# Patient Record
Sex: FEMALE | NOT HISPANIC OR LATINO | Employment: STUDENT | URBAN - METROPOLITAN AREA
[De-identification: names, ages, dates, MRNs, and addresses within clinical notes are randomized per-mention and may not be internally consistent; named-entity substitution may affect disease eponyms.]

---

## 2023-04-22 PROBLEM — E55.9 VITAMIN D DEFICIENCY: Status: ACTIVE | Noted: 2023-04-22

## 2023-04-22 PROBLEM — N92.0 MENORRHAGIA: Status: ACTIVE | Noted: 2023-04-22

## 2023-04-22 PROBLEM — L73.2 HIDRADENITIS SUPPURATIVA: Status: ACTIVE | Noted: 2023-04-22

## 2023-04-22 PROBLEM — F41.8 DEPRESSION WITH ANXIETY: Status: ACTIVE | Noted: 2023-04-22

## 2023-04-22 PROBLEM — N92.6 IRREGULAR MENSES: Status: ACTIVE | Noted: 2023-04-22

## 2023-04-22 PROBLEM — N62 BREAST HYPERTROPHY: Status: ACTIVE | Noted: 2023-04-22

## 2023-04-22 PROBLEM — E66.01 MORBID OBESITY (MULTI): Status: ACTIVE | Noted: 2023-04-22

## 2023-04-22 PROBLEM — L83 ACANTHOSIS NIGRICANS: Status: ACTIVE | Noted: 2023-04-22

## 2023-04-22 PROBLEM — H90.3 BILATERAL SENSORINEURAL HEARING LOSS: Status: ACTIVE | Noted: 2023-04-22

## 2023-04-22 PROBLEM — M54.2 CERVICALGIA: Status: ACTIVE | Noted: 2023-04-22

## 2023-04-22 PROBLEM — F90.9 ATTENTION-DEFICIT/HYPERACTIVITY DISORDER: Status: ACTIVE | Noted: 2023-04-22

## 2023-04-22 PROBLEM — C69.20: Status: ACTIVE | Noted: 2023-04-22

## 2023-04-22 PROBLEM — L83: Status: ACTIVE | Noted: 2023-04-22

## 2023-04-22 PROBLEM — G44.89 CHRONIC MIXED HEADACHE SYNDROME: Status: ACTIVE | Noted: 2023-04-22

## 2023-04-22 PROBLEM — H93.293 AUDITORY DISCRIMINATION IMPAIRMENT, BILATERAL: Status: ACTIVE | Noted: 2023-04-22

## 2023-04-22 PROBLEM — Z15.09: Status: ACTIVE | Noted: 2023-04-22

## 2023-04-22 PROBLEM — Z90.01 H/O ENUCLEATION OF LEFT EYEBALL: Status: ACTIVE | Noted: 2023-04-22

## 2023-04-22 PROBLEM — G47.00 INSOMNIA: Status: ACTIVE | Noted: 2023-04-22

## 2023-04-22 PROBLEM — L30.4 INTERTRIGO: Status: ACTIVE | Noted: 2023-04-22

## 2023-04-22 PROBLEM — F50.9 EATING DISORDER: Status: ACTIVE | Noted: 2023-04-22

## 2023-04-22 PROBLEM — C69.90: Status: ACTIVE | Noted: 2023-04-22

## 2023-04-22 RX ORDER — ARIPIPRAZOLE 5 MG/1
5 TABLET ORAL
COMMUNITY
Start: 2023-04-13

## 2023-04-22 RX ORDER — VENLAFAXINE HYDROCHLORIDE 75 MG/1
75 CAPSULE, EXTENDED RELEASE ORAL DAILY
COMMUNITY
Start: 2022-04-04

## 2023-04-22 RX ORDER — HYDROXYZINE PAMOATE 50 MG/1
50 CAPSULE ORAL NIGHTLY PRN
COMMUNITY
Start: 2022-04-04

## 2023-04-22 RX ORDER — ERGOCALCIFEROL 1.25 MG/1
1.25 CAPSULE ORAL
COMMUNITY

## 2023-04-22 RX ORDER — ROSUVASTATIN CALCIUM 5 MG/1
5 TABLET, COATED ORAL DAILY
COMMUNITY

## 2023-04-24 ENCOUNTER — OFFICE VISIT (OUTPATIENT)
Dept: PRIMARY CARE | Facility: CLINIC | Age: 23
End: 2023-04-24
Payer: COMMERCIAL

## 2023-04-24 VITALS
OXYGEN SATURATION: 97 % | WEIGHT: 293 LBS | BODY MASS INDEX: 48.82 KG/M2 | SYSTOLIC BLOOD PRESSURE: 120 MMHG | HEART RATE: 89 BPM | HEIGHT: 65 IN | DIASTOLIC BLOOD PRESSURE: 80 MMHG

## 2023-04-24 DIAGNOSIS — M79.671 CHRONIC FOOT PAIN, RIGHT: Primary | ICD-10-CM

## 2023-04-24 DIAGNOSIS — G89.29 CHRONIC FOOT PAIN, RIGHT: Primary | ICD-10-CM

## 2023-04-24 PROCEDURE — 99214 OFFICE O/P EST MOD 30 MIN: CPT | Performed by: FAMILY MEDICINE

## 2023-04-24 PROCEDURE — 1036F TOBACCO NON-USER: CPT | Performed by: FAMILY MEDICINE

## 2023-04-24 ASSESSMENT — PATIENT HEALTH QUESTIONNAIRE - PHQ9
1. LITTLE INTEREST OR PLEASURE IN DOING THINGS: SEVERAL DAYS
2. FEELING DOWN, DEPRESSED OR HOPELESS: SEVERAL DAYS
SUM OF ALL RESPONSES TO PHQ9 QUESTIONS 1 AND 2: 2
10. IF YOU CHECKED OFF ANY PROBLEMS, HOW DIFFICULT HAVE THESE PROBLEMS MADE IT FOR YOU TO DO YOUR WORK, TAKE CARE OF THINGS AT HOME, OR GET ALONG WITH OTHER PEOPLE: SOMEWHAT DIFFICULT

## 2023-04-24 NOTE — PATIENT INSTRUCTIONS
Please follow up with the dermatologist for the boils.     Please continue the outpatient treatment for the mood     Blood pressure looks good.     Referral to the foot specialist.     Once you get all your medications stable for several months, as your psychiatrist if Semaglutide is safe to try and then we can initiate the prior authorization.     Follow up in 3 months or sooner a needed,

## 2023-04-24 NOTE — PROGRESS NOTES
"Subjective   Patient ID: Ellie Babcock is a 23 y.o. female who presents for Follow-up (On medical leave to get mental help).    HPI   Ellie is a pleasant 23 yr old female here for follow up   Brother is a pt of mine. 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     #) Depression/ anxiety- had lapse of suicidal ideations   - on wellbutrin and fluoxetine --> abilify, effexor, clonadine   - follows with [facility]   Personal Hospitalization Program - PHP, 3 hours for 5 days per week for 30 days   - To learn coping skills, setting boundaries, and medication compliance      #) right foot pain - sounds like right acheles tendinitis - still hurting bad   - worse in the AM   - did got [facility] were ok  - will refer to podiatry     #) large pendulous breasts- down 35# in one year   - would like a breast reduction- was denied by plastic surgeon she saw   - difficulty finding clothes that fit   - pain in shoulders and back   - straps digging into skin and bruising skin     #) Suppurativa hidradinitis - bad right now in the right arm   - been to the ER several times for I&D and abx therapy   - will refer to derm for possible use of Humira -- was started on doxycycline and cream   - still having-- has a follow up coming up     #) ADHD in the past - not on medications  - 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 [employer]  - some distracted behaviors   - is wanting to restart add meds     #) 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   Constitutional:       General: She is not in acute distress.     Appearance: She is not toxic-appearing.   HENT:      Head: Normocephalic and atraumatic.      Right Ear: Tympanic membrane and ear canal normal. There is no impacted cerumen.      Left Ear: Tympanic membrane and ear canal normal. There is no impacted cerumen.      Nose: No congestion or rhinorrhea.      Mouth/Throat:      Mouth: Mucous membranes are moist.      Pharynx: No oropharyngeal exudate or posterior oropharyngeal erythema.   Eyes:      Extraocular Movements: Extraocular movements intact.      Conjunctiva/sclera: Conjunctivae normal.      Pupils: Pupils are equal, round, and reactive to light.   Cardiovascular:      Rate and Rhythm: Normal rate and regular rhythm.      Heart sounds: Normal heart sounds. No murmur heard.  Pulmonary:      Effort: No respiratory distress.      Breath sounds: No wheezing.   Abdominal:      General: Bowel sounds are normal.      Palpations: Abdomen is soft.      Tenderness: There is no abdominal tenderness.   Musculoskeletal:         General: No deformity. Normal range of motion.      Cervical back: Neck supple. No tenderness.      Right lower leg: No edema.      Left lower leg: No edema.   Lymphadenopathy:      Cervical: No cervical adenopathy.   Skin:     Findings: No bruising or rash.   Neurological:      Mental Status: She is alert.      Cranial Nerves: No cranial nerve deficit.      Motor: No weakness.      Gait: Gait normal.   Psychiatric:         Mood and Affect: Mood normal.         Assessment/Plan   Problem List Items Addressed This Visit    None  Visit Diagnoses       Chronic foot pain, right    -  Primary    Relevant Orders    Referral to Podiatry        Please follow up with the dermatologist for the boils.     Please continue the outpatient treatment for the mood     Blood pressure looks good.     Referral to the  foot specialist.     Once you get all your medications stable for several months, as your psychiatrist if Semaglutide is safe to try and then we can initiate the prior authorization.     Follow up in 3 months or sooner a needed,

## 2023-07-24 ENCOUNTER — APPOINTMENT (OUTPATIENT)
Dept: PRIMARY CARE | Facility: CLINIC | Age: 23
End: 2023-07-24
Payer: COMMERCIAL

## 2024-02-29 ENCOUNTER — APPOINTMENT (OUTPATIENT)
Dept: RADIOLOGY | Facility: HOSPITAL | Age: 24
End: 2024-02-29
Payer: COMMERCIAL

## 2024-02-29 ENCOUNTER — HOSPITAL ENCOUNTER (EMERGENCY)
Facility: HOSPITAL | Age: 24
Discharge: HOME | End: 2024-02-29
Attending: STUDENT IN AN ORGANIZED HEALTH CARE EDUCATION/TRAINING PROGRAM
Payer: COMMERCIAL

## 2024-02-29 VITALS
DIASTOLIC BLOOD PRESSURE: 76 MMHG | TEMPERATURE: 97.3 F | HEIGHT: 65 IN | RESPIRATION RATE: 16 BRPM | WEIGHT: 293 LBS | SYSTOLIC BLOOD PRESSURE: 111 MMHG | OXYGEN SATURATION: 100 % | HEART RATE: 75 BPM | BODY MASS INDEX: 48.82 KG/M2

## 2024-02-29 DIAGNOSIS — U07.1 COVID-19: Primary | ICD-10-CM

## 2024-02-29 DIAGNOSIS — R42 DIZZINESS: ICD-10-CM

## 2024-02-29 DIAGNOSIS — R11.2 NAUSEA AND VOMITING, UNSPECIFIED VOMITING TYPE: ICD-10-CM

## 2024-02-29 LAB
ALBUMIN SERPL-MCNC: 3.7 G/DL (ref 3.5–5)
ALP BLD-CCNC: 126 U/L (ref 35–125)
ALT SERPL-CCNC: 15 U/L (ref 5–40)
ANION GAP SERPL CALC-SCNC: 9 MMOL/L
APPEARANCE UR: ABNORMAL
AST SERPL-CCNC: 23 U/L (ref 5–40)
BACTERIA #/AREA URNS AUTO: ABNORMAL /HPF
BASOPHILS # BLD AUTO: 0.03 X10*3/UL (ref 0–0.1)
BASOPHILS NFR BLD AUTO: 0.2 %
BILIRUB SERPL-MCNC: 0.5 MG/DL (ref 0.1–1.2)
BILIRUB UR STRIP.AUTO-MCNC: NEGATIVE MG/DL
BUN SERPL-MCNC: 8 MG/DL (ref 8–25)
BURR CELLS BLD QL SMEAR: NORMAL
CALCIUM SERPL-MCNC: 8.6 MG/DL (ref 8.5–10.4)
CHLORIDE SERPL-SCNC: 105 MMOL/L (ref 97–107)
CO2 SERPL-SCNC: 24 MMOL/L (ref 24–31)
COLOR UR: YELLOW
CREAT SERPL-MCNC: 0.7 MG/DL (ref 0.4–1.6)
EGFRCR SERPLBLD CKD-EPI 2021: >90 ML/MIN/1.73M*2
EOSINOPHIL # BLD AUTO: 0.03 X10*3/UL (ref 0–0.7)
EOSINOPHIL NFR BLD AUTO: 0.2 %
ERYTHROCYTE [DISTWIDTH] IN BLOOD BY AUTOMATED COUNT: 22.5 % (ref 11.5–14.5)
FLUAV RNA RESP QL NAA+PROBE: NOT DETECTED
FLUBV RNA RESP QL NAA+PROBE: NOT DETECTED
GLUCOSE SERPL-MCNC: 119 MG/DL (ref 65–99)
GLUCOSE UR STRIP.AUTO-MCNC: NORMAL MG/DL
HCG SERPL-ACNC: <1 MIU/ML
HCT VFR BLD AUTO: 36.8 % (ref 36–46)
HGB BLD-MCNC: 10.9 G/DL (ref 12–16)
HOLD SPECIMEN: NORMAL
IMM GRANULOCYTES # BLD AUTO: 0.08 X10*3/UL (ref 0–0.7)
IMM GRANULOCYTES NFR BLD AUTO: 0.4 % (ref 0–0.9)
KETONES UR STRIP.AUTO-MCNC: ABNORMAL MG/DL
LEUKOCYTE ESTERASE UR QL STRIP.AUTO: NEGATIVE
LYMPHOCYTES # BLD AUTO: 1.61 X10*3/UL (ref 1.2–4.8)
LYMPHOCYTES NFR BLD AUTO: 8.7 %
MCH RBC QN AUTO: 25.1 PG (ref 26–34)
MCHC RBC AUTO-ENTMCNC: 29.6 G/DL (ref 32–36)
MCV RBC AUTO: 85 FL (ref 80–100)
MONOCYTES # BLD AUTO: 0.48 X10*3/UL (ref 0.1–1)
MONOCYTES NFR BLD AUTO: 2.6 %
MUCOUS THREADS #/AREA URNS AUTO: ABNORMAL /LPF
NEUTROPHILS # BLD AUTO: 16.31 X10*3/UL (ref 1.2–7.7)
NEUTROPHILS NFR BLD AUTO: 87.9 %
NITRITE UR QL STRIP.AUTO: NEGATIVE
NRBC BLD-RTO: 0.1 /100 WBCS (ref 0–0)
OVALOCYTES BLD QL SMEAR: NORMAL
PH UR STRIP.AUTO: 6 [PH]
PLATELET # BLD AUTO: 285 X10*3/UL (ref 150–450)
POTASSIUM SERPL-SCNC: 4.6 MMOL/L (ref 3.4–5.1)
PROT SERPL-MCNC: 7.8 G/DL (ref 5.9–7.9)
PROT UR STRIP.AUTO-MCNC: ABNORMAL MG/DL
RBC # BLD AUTO: 4.34 X10*6/UL (ref 4–5.2)
RBC # UR STRIP.AUTO: NEGATIVE /UL
RBC #/AREA URNS AUTO: ABNORMAL /HPF
RBC MORPH BLD: NORMAL
SARS-COV-2 RNA RESP QL NAA+PROBE: DETECTED
SODIUM SERPL-SCNC: 138 MMOL/L (ref 133–145)
SP GR UR STRIP.AUTO: 1.03
SQUAMOUS #/AREA URNS AUTO: ABNORMAL /HPF
TARGETS BLD QL SMEAR: NORMAL
UROBILINOGEN UR STRIP.AUTO-MCNC: ABNORMAL MG/DL
WBC # BLD AUTO: 18.5 X10*3/UL (ref 4.4–11.3)
WBC #/AREA URNS AUTO: ABNORMAL /HPF

## 2024-02-29 PROCEDURE — 36415 COLL VENOUS BLD VENIPUNCTURE: CPT

## 2024-02-29 PROCEDURE — 87636 SARSCOV2 & INF A&B AMP PRB: CPT

## 2024-02-29 PROCEDURE — 81001 URINALYSIS AUTO W/SCOPE: CPT

## 2024-02-29 PROCEDURE — 85025 COMPLETE CBC W/AUTO DIFF WBC: CPT

## 2024-02-29 PROCEDURE — 2500000001 HC RX 250 WO HCPCS SELF ADMINISTERED DRUGS (ALT 637 FOR MEDICARE OP)

## 2024-02-29 PROCEDURE — 80053 COMPREHEN METABOLIC PANEL: CPT

## 2024-02-29 PROCEDURE — 2500000004 HC RX 250 GENERAL PHARMACY W/ HCPCS (ALT 636 FOR OP/ED): Performed by: STUDENT IN AN ORGANIZED HEALTH CARE EDUCATION/TRAINING PROGRAM

## 2024-02-29 PROCEDURE — 96361 HYDRATE IV INFUSION ADD-ON: CPT

## 2024-02-29 PROCEDURE — 96376 TX/PRO/DX INJ SAME DRUG ADON: CPT | Mod: 59

## 2024-02-29 PROCEDURE — 96374 THER/PROPH/DIAG INJ IV PUSH: CPT | Mod: 59

## 2024-02-29 PROCEDURE — 96375 TX/PRO/DX INJ NEW DRUG ADDON: CPT | Mod: 59

## 2024-02-29 PROCEDURE — 71045 X-RAY EXAM CHEST 1 VIEW: CPT | Performed by: RADIOLOGY

## 2024-02-29 PROCEDURE — 71045 X-RAY EXAM CHEST 1 VIEW: CPT

## 2024-02-29 PROCEDURE — 70491 CT SOFT TISSUE NECK W/DYE: CPT

## 2024-02-29 PROCEDURE — 70491 CT SOFT TISSUE NECK W/DYE: CPT | Performed by: RADIOLOGY

## 2024-02-29 PROCEDURE — 84702 CHORIONIC GONADOTROPIN TEST: CPT

## 2024-02-29 PROCEDURE — 99284 EMERGENCY DEPT VISIT MOD MDM: CPT | Mod: 25

## 2024-02-29 PROCEDURE — 2550000001 HC RX 255 CONTRASTS: Performed by: STUDENT IN AN ORGANIZED HEALTH CARE EDUCATION/TRAINING PROGRAM

## 2024-02-29 PROCEDURE — 2500000004 HC RX 250 GENERAL PHARMACY W/ HCPCS (ALT 636 FOR OP/ED)

## 2024-02-29 RX ORDER — METOCLOPRAMIDE HYDROCHLORIDE 5 MG/ML
10 INJECTION INTRAMUSCULAR; INTRAVENOUS ONCE
Status: COMPLETED | OUTPATIENT
Start: 2024-02-29 | End: 2024-02-29

## 2024-02-29 RX ORDER — ACETAMINOPHEN 325 MG/1
650 TABLET ORAL ONCE
Status: COMPLETED | OUTPATIENT
Start: 2024-02-29 | End: 2024-02-29

## 2024-02-29 RX ORDER — MECLIZINE HYDROCHLORIDE 25 MG/1
25 TABLET ORAL ONCE
Status: COMPLETED | OUTPATIENT
Start: 2024-02-29 | End: 2024-02-29

## 2024-02-29 RX ORDER — MECLIZINE HYDROCHLORIDE 25 MG/1
25 TABLET ORAL 3 TIMES DAILY PRN
Qty: 15 TABLET | Refills: 0 | Status: SHIPPED | OUTPATIENT
Start: 2024-02-29 | End: 2024-03-05

## 2024-02-29 RX ORDER — ONDANSETRON HYDROCHLORIDE 2 MG/ML
4 INJECTION, SOLUTION INTRAVENOUS ONCE
Status: COMPLETED | OUTPATIENT
Start: 2024-02-29 | End: 2024-02-29

## 2024-02-29 RX ORDER — METOCLOPRAMIDE 10 MG/1
10 TABLET ORAL EVERY 6 HOURS
Qty: 20 TABLET | Refills: 0 | Status: SHIPPED | OUTPATIENT
Start: 2024-02-29 | End: 2024-03-05

## 2024-02-29 RX ADMIN — IOHEXOL 75 ML: 350 INJECTION, SOLUTION INTRAVENOUS at 10:55

## 2024-02-29 RX ADMIN — ONDANSETRON 4 MG: 2 INJECTION INTRAMUSCULAR; INTRAVENOUS at 08:29

## 2024-02-29 RX ADMIN — METOCLOPRAMIDE 10 MG: 5 INJECTION, SOLUTION INTRAMUSCULAR; INTRAVENOUS at 11:52

## 2024-02-29 RX ADMIN — SODIUM CHLORIDE 1000 ML: 900 INJECTION, SOLUTION INTRAVENOUS at 08:29

## 2024-02-29 RX ADMIN — ACETAMINOPHEN 650 MG: 325 TABLET ORAL at 08:30

## 2024-02-29 RX ADMIN — MECLIZINE HYDROCHLORIDE 25 MG: 25 TABLET ORAL at 11:50

## 2024-02-29 RX ADMIN — ONDANSETRON 4 MG: 2 INJECTION INTRAMUSCULAR; INTRAVENOUS at 10:17

## 2024-02-29 ASSESSMENT — PAIN - FUNCTIONAL ASSESSMENT: PAIN_FUNCTIONAL_ASSESSMENT: 0-10

## 2024-02-29 ASSESSMENT — COLUMBIA-SUICIDE SEVERITY RATING SCALE - C-SSRS
1. IN THE PAST MONTH, HAVE YOU WISHED YOU WERE DEAD OR WISHED YOU COULD GO TO SLEEP AND NOT WAKE UP?: NO
6. HAVE YOU EVER DONE ANYTHING, STARTED TO DO ANYTHING, OR PREPARED TO DO ANYTHING TO END YOUR LIFE?: NO
2. HAVE YOU ACTUALLY HAD ANY THOUGHTS OF KILLING YOURSELF?: NO

## 2024-02-29 ASSESSMENT — PAIN SCALES - GENERAL: PAINLEVEL_OUTOF10: 0 - NO PAIN

## 2024-02-29 ASSESSMENT — LIFESTYLE VARIABLES
EVER HAD A DRINK FIRST THING IN THE MORNING TO STEADY YOUR NERVES TO GET RID OF A HANGOVER: NO
EVER FELT BAD OR GUILTY ABOUT YOUR DRINKING: NO
HAVE PEOPLE ANNOYED YOU BY CRITICIZING YOUR DRINKING: NO
HAVE YOU EVER FELT YOU SHOULD CUT DOWN ON YOUR DRINKING: NO

## 2024-02-29 NOTE — DISCHARGE INSTRUCTIONS
Please take the Reglan and meclizine for your symptoms.  Reglan is for her nausea and meclizine is for your dizziness.  Rest and hydrate at home.  Present back to ER if you develop any worsening of symptoms.  Please follow-up with your primary care provider within 24 hours regarding your ER visit.

## 2024-02-29 NOTE — Clinical Note
Ellie Babcock was seen and treated in our emergency department on 2/29/2024.  She may return to work on 03/04/2024.       If you have any questions or concerns, please don't hesitate to call.      Neeta Kohli PA-C

## 2024-02-29 NOTE — ED PROVIDER NOTES
HPI   Chief Complaint   Patient presents with    Flu Symptoms       Patient is a 24-year-old female presenting for evaluation of flulike symptoms for the past 2 days.  Patient states that 3 days ago she went to urgent care and was diagnosed with strep throat and was placed on amoxicillin.  She believes she is having adverse side effects of the amoxicillin as she has been nauseous, vomiting and feeling lightheaded.  Denies fevers.  States her sore throat is improved.  Denies abdominal pain, urinary symptoms, chest pain, shortness of breath.                        Liu Coma Scale Score: 15                     Patient History   Past Medical History:   Diagnosis Date    Acanthosis nigricans 02/25/2015    Papillomatosis, Gougerot-Carteaud confluent reticulate    Contact with and (suspected) exposure to other bacterial communicable diseases     Exposure to strep throat    Contact with and (suspected) exposure to pediculosis, acariasis and other infestations 07/01/2016    Scabies exposure    Other specified anxiety disorders 01/19/2017    Depression with anxiety    Otitis media, unspecified, right ear 12/10/2018    Acute right otitis media    Personal history of diseases of the skin and subcutaneous tissue 08/22/2015    History of pilonidal cyst    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    Personal history of other (healed) physical injury and trauma 08/13/2015    History of sprain of ankle    Personal history of other diseases of the respiratory system 04/16/2018    History of streptococcal pharyngitis    Personal history of other specified conditions 04/22/2015    History of abdominal pain    Personal history of other specified conditions 04/18/2016    History of epigastric pain    Pruritus, unspecified 06/17/2015    Ear itching    Right upper quadrant pain 11/20/2013    Abdominal pain, RUQ (right upper quadrant)    Secondary amenorrhea 06/17/2015    Amenorrhea, secondary     Unspecified injury of unspecified foot, initial encounter 08/06/2015    Foot injury    Unspecified injury of unspecified lower leg, initial encounter 06/27/2013    Knee injury    Unspecified sensorineural hearing loss     Hearing loss, sensorineural     Past Surgical History:   Procedure Laterality Date    OTHER SURGICAL HISTORY  10/09/2013    Globe Enucleation Left    OTHER SURGICAL HISTORY  11/19/2014    Patient Education - Child Adoption     No family history on file.  Social History     Tobacco Use    Smoking status: Never    Smokeless tobacco: Never   Vaping Use    Vaping Use: Some days   Substance Use Topics    Alcohol use: Yes     Comment: occassional    Drug use: Yes     Types: Marijuana       Physical Exam   ED Triage Vitals [02/29/24 0745]   Temperature Heart Rate Respirations BP   36.3 °C (97.3 °F) 74 16 112/70      Pulse Ox Temp src Heart Rate Source Patient Position   97 % -- -- --      BP Location FiO2 (%)     -- --       Physical Exam  Vitals and nursing note reviewed.   Constitutional:       General: She is not in acute distress.     Appearance: She is obese.      Comments: Resting in wheelchair in no acute distress   HENT:      Head: Normocephalic and atraumatic.      Mouth/Throat:      Comments: Mucous membranes moist.  Voice slightly muffled.  Tonsils are symmetrically enlarged with uvula midline.  No uvular deviation.  No angioedema.  No sublingual edema.  Tolerating secretions, no drooling or tripoding.  Cardiovascular:      Rate and Rhythm: Normal rate and regular rhythm.      Heart sounds: Normal heart sounds.   Pulmonary:      Effort: Pulmonary effort is normal.      Breath sounds: Normal breath sounds.   Abdominal:      General: Abdomen is flat.      Palpations: Abdomen is soft.      Tenderness: There is no abdominal tenderness. There is no right CVA tenderness or left CVA tenderness.   Musculoskeletal:      Cervical back: Normal range of motion and neck supple. No rigidity or tenderness.    Lymphadenopathy:      Cervical: No cervical adenopathy.   Skin:     General: Skin is warm and dry.   Neurological:      Mental Status: She is alert and oriented to person, place, and time.   Psychiatric:         Mood and Affect: Mood normal.         Behavior: Behavior normal.         ED Course & MDM   ED Course as of 02/29/24 1827   u Feb 29, 2024   1008 Coronavirus 2019, PCR(!): Detected [JM]   1247 Patient drank ginger ale and had saltines and reports feeling better with the medications at this time. [JJ]      ED Course User Index  [JJ] Neeta Kohli PA-C  [JM] Mindi Varela MD         Diagnoses as of 02/29/24 1827   COVID-19   Nausea and vomiting, unspecified vomiting type   Dizziness       Medical Decision Making  Parts of this chart have been completed using voice recognition software. Please excuse any errors of transcription.  My thought process and reason for plan has been formulated from the time that I saw the patient until the time of disposition and is not specific to one specific moment during their visit and furthermore my MDM encompasses this entire chart and not only this text box.      HPI: Detailed above.    Exam: A medically appropriate exam performed, outlined above, given the known history and presentation.    History obtained from: Patient    Medications given during visit:  Medications   sodium chloride 0.9 % bolus 1,000 mL (has no administration in time range)   ondansetron (Zofran) injection 4 mg (has no administration in time range)   acetaminophen (Tylenol) tablet 650 mg (has no administration in time range)        Diagnostic/tests  Labs Reviewed   URINALYSIS WITH REFLEX CULTURE AND MICROSCOPIC    Narrative:     The following orders were created for panel order Urinalysis with Reflex Culture and Microscopic.  Procedure                               Abnormality         Status                     ---------                               -----------         ------                      Urinalysis with Reflex Cu...[[id number]]                                                 Extra Urine Gray Tube[[id number]]                                                          Please view results for these tests on the individual orders.   URINALYSIS WITH REFLEX CULTURE AND MICROSCOPIC   EXTRA URINE GRAY TUBE   POCT PREGNANCY, URINE      No orders to display        Considerations/further MDM:  24-year-old female presenting for evaluation of flulike symptoms.  During the ER visit the patient is awake and alert but appears tired.  Neurologically and vascularly intact.  Vital signs stable during the visit.  On exam, patient does have symmetrically enlarged tonsils with muffled voice.  Thus CT soft tissue neck was performed to rule out presence of abscess.  CT was negative for any soft tissue abscess, retropharyngeal abscess, peritonsillar abscess, sublingual abscess. Patient was given fluids, Zofran, Tylenol for her symptoms.  Basic laboratory workup significant for leukocytosis likely secondary to the patient's COVID-19 infection.  No electrolyte abnormality present.  Her nausea and dizziness continued thus she was given meclizine and Reglan.  Patient's symptoms improved and she was able to drink ginger ale and eat saltines.  Thus I believe she is appropriate for discharge with primary care follow-up within 48 hours.  He was instructed to stay well-hydrated and rest at home.  She was given reasons to return to ER if she experiences any worsening of symptoms such as increased fever, nausea, vomiting that is intractable.  The patient stated her understanding of the treatment plan was agreeable.  She was discharged home in stable condition.      Procedure  Procedures     Neeta Kohli PA-C  02/29/24 9264

## 2024-02-29 NOTE — ED TRIAGE NOTES
Pt states that she has been on antibiotics for strep. Pt states that she feels weak and has vomited as well.

## 2024-05-21 ENCOUNTER — APPOINTMENT (OUTPATIENT)
Dept: RADIOLOGY | Facility: HOSPITAL | Age: 24
End: 2024-05-21
Payer: COMMERCIAL

## 2024-05-21 ENCOUNTER — HOSPITAL ENCOUNTER (EMERGENCY)
Facility: HOSPITAL | Age: 24
Discharge: HOME | End: 2024-05-21
Attending: EMERGENCY MEDICINE
Payer: COMMERCIAL

## 2024-05-21 ENCOUNTER — APPOINTMENT (OUTPATIENT)
Dept: CARDIOLOGY | Facility: HOSPITAL | Age: 24
End: 2024-05-21
Payer: COMMERCIAL

## 2024-05-21 VITALS
DIASTOLIC BLOOD PRESSURE: 83 MMHG | OXYGEN SATURATION: 99 % | RESPIRATION RATE: 18 BRPM | TEMPERATURE: 97.5 F | SYSTOLIC BLOOD PRESSURE: 127 MMHG | HEART RATE: 60 BPM

## 2024-05-21 DIAGNOSIS — R51.9 ACUTE NONINTRACTABLE HEADACHE, UNSPECIFIED HEADACHE TYPE: Primary | ICD-10-CM

## 2024-05-21 LAB
ANION GAP SERPL CALC-SCNC: 12 MMOL/L (ref 10–20)
ANION GAP SERPL CALC-SCNC: NORMAL MMOL/L
BASOPHILS # BLD AUTO: NORMAL 10*3/UL
BASOPHILS NFR BLD AUTO: NORMAL %
BUN SERPL-MCNC: 7 MG/DL (ref 6–23)
BUN SERPL-MCNC: NORMAL MG/DL
CALCIUM SERPL-MCNC: 8.8 MG/DL (ref 8.6–10.3)
CALCIUM SERPL-MCNC: NORMAL MG/DL
CHLORIDE SERPL-SCNC: 104 MMOL/L (ref 98–107)
CHLORIDE SERPL-SCNC: NORMAL MMOL/L
CO2 SERPL-SCNC: 26 MMOL/L (ref 21–32)
CO2 SERPL-SCNC: NORMAL MMOL/L
CREAT SERPL-MCNC: 0.81 MG/DL (ref 0.5–1.05)
CREAT SERPL-MCNC: NORMAL MG/DL
CRP SERPL-MCNC: 0.64 MG/DL
CRP SERPL-MCNC: NORMAL MG/L
EGFRCR SERPLBLD CKD-EPI 2021: >90 ML/MIN/1.73M*2
EGFRCR SERPLBLD CKD-EPI 2021: NORMAL ML/MIN/{1.73_M2}
EOSINOPHIL # BLD AUTO: NORMAL 10*3/UL
EOSINOPHIL NFR BLD AUTO: NORMAL %
ERYTHROCYTE [DISTWIDTH] IN BLOOD BY AUTOMATED COUNT: 20.9 % (ref 11.5–14.5)
ERYTHROCYTE [DISTWIDTH] IN BLOOD BY AUTOMATED COUNT: NORMAL %
ERYTHROCYTE [SEDIMENTATION RATE] IN BLOOD BY WESTERGREN METHOD: 75 MM/H (ref 0–20)
ERYTHROCYTE [SEDIMENTATION RATE] IN BLOOD BY WESTERGREN METHOD: NORMAL MM/H
GLUCOSE SERPL-MCNC: 84 MG/DL (ref 74–99)
GLUCOSE SERPL-MCNC: NORMAL MG/DL
HCT VFR BLD AUTO: 36 % (ref 36–46)
HCT VFR BLD AUTO: NORMAL %
HGB BLD-MCNC: 11.4 G/DL (ref 12–16)
HGB BLD-MCNC: NORMAL G/DL
IMM GRANULOCYTES # BLD AUTO: NORMAL 10*3/UL
IMM GRANULOCYTES NFR BLD AUTO: NORMAL %
LYMPHOCYTES # BLD AUTO: NORMAL 10*3/UL
LYMPHOCYTES NFR BLD AUTO: NORMAL %
MCH RBC QN AUTO: 26.1 PG (ref 26–34)
MCH RBC QN AUTO: NORMAL PG
MCHC RBC AUTO-ENTMCNC: 31.7 G/DL (ref 32–36)
MCHC RBC AUTO-ENTMCNC: NORMAL G/DL
MCV RBC AUTO: 82 FL (ref 80–100)
MCV RBC AUTO: NORMAL FL
MONOCYTES # BLD AUTO: NORMAL 10*3/UL
MONOCYTES NFR BLD AUTO: NORMAL %
NEUTROPHILS # BLD AUTO: NORMAL 10*3/UL
NEUTROPHILS NFR BLD AUTO: NORMAL %
NRBC BLD-RTO: 0 /100 WBCS (ref 0–0)
NRBC BLD-RTO: NORMAL /100{WBCS}
PLATELET # BLD AUTO: 353 X10*3/UL (ref 150–450)
PLATELET # BLD AUTO: NORMAL 10*3/UL
POTASSIUM SERPL-SCNC: 5 MMOL/L (ref 3.5–5.3)
POTASSIUM SERPL-SCNC: NORMAL MMOL/L
RBC # BLD AUTO: 4.37 X10*6/UL (ref 4–5.2)
RBC # BLD AUTO: NORMAL 10*6/UL
SODIUM SERPL-SCNC: 137 MMOL/L (ref 136–145)
SODIUM SERPL-SCNC: NORMAL MMOL/L
WBC # BLD AUTO: 13.2 X10*3/UL (ref 4.4–11.3)
WBC # BLD AUTO: NORMAL 10*3/UL

## 2024-05-21 PROCEDURE — 86140 C-REACTIVE PROTEIN: CPT | Performed by: PHYSICIAN ASSISTANT

## 2024-05-21 PROCEDURE — 36415 COLL VENOUS BLD VENIPUNCTURE: CPT | Performed by: PHYSICIAN ASSISTANT

## 2024-05-21 PROCEDURE — 85652 RBC SED RATE AUTOMATED: CPT | Performed by: EMERGENCY MEDICINE

## 2024-05-21 PROCEDURE — 96375 TX/PRO/DX INJ NEW DRUG ADDON: CPT

## 2024-05-21 PROCEDURE — 80048 BASIC METABOLIC PNL TOTAL CA: CPT | Performed by: PHYSICIAN ASSISTANT

## 2024-05-21 PROCEDURE — 85027 COMPLETE CBC AUTOMATED: CPT | Performed by: EMERGENCY MEDICINE

## 2024-05-21 PROCEDURE — 36415 COLL VENOUS BLD VENIPUNCTURE: CPT | Performed by: EMERGENCY MEDICINE

## 2024-05-21 PROCEDURE — 93005 ELECTROCARDIOGRAM TRACING: CPT

## 2024-05-21 PROCEDURE — 96361 HYDRATE IV INFUSION ADD-ON: CPT

## 2024-05-21 PROCEDURE — 85652 RBC SED RATE AUTOMATED: CPT | Performed by: PHYSICIAN ASSISTANT

## 2024-05-21 PROCEDURE — 96374 THER/PROPH/DIAG INJ IV PUSH: CPT

## 2024-05-21 PROCEDURE — 2500000001 HC RX 250 WO HCPCS SELF ADMINISTERED DRUGS (ALT 637 FOR MEDICARE OP): Performed by: EMERGENCY MEDICINE

## 2024-05-21 PROCEDURE — 2500000004 HC RX 250 GENERAL PHARMACY W/ HCPCS (ALT 636 FOR OP/ED): Performed by: EMERGENCY MEDICINE

## 2024-05-21 PROCEDURE — 99285 EMERGENCY DEPT VISIT HI MDM: CPT | Mod: 25

## 2024-05-21 PROCEDURE — 80048 BASIC METABOLIC PNL TOTAL CA: CPT | Performed by: EMERGENCY MEDICINE

## 2024-05-21 PROCEDURE — 86140 C-REACTIVE PROTEIN: CPT | Performed by: EMERGENCY MEDICINE

## 2024-05-21 PROCEDURE — 85025 COMPLETE CBC W/AUTO DIFF WBC: CPT | Performed by: PHYSICIAN ASSISTANT

## 2024-05-21 PROCEDURE — 70450 CT HEAD/BRAIN W/O DYE: CPT | Performed by: RADIOLOGY

## 2024-05-21 PROCEDURE — 70450 CT HEAD/BRAIN W/O DYE: CPT

## 2024-05-21 RX ORDER — DIPHENHYDRAMINE HYDROCHLORIDE 50 MG/ML
25 INJECTION INTRAMUSCULAR; INTRAVENOUS ONCE
Status: COMPLETED | OUTPATIENT
Start: 2024-05-21 | End: 2024-05-21

## 2024-05-21 RX ORDER — MECLIZINE HYDROCHLORIDE 25 MG/1
25 TABLET ORAL ONCE
Status: COMPLETED | OUTPATIENT
Start: 2024-05-21 | End: 2024-05-21

## 2024-05-21 RX ORDER — MAGNESIUM SULFATE 1 G/100ML
1 INJECTION INTRAVENOUS ONCE
Status: DISCONTINUED | OUTPATIENT
Start: 2024-05-21 | End: 2024-05-21 | Stop reason: HOSPADM

## 2024-05-21 RX ORDER — MECLIZINE HYDROCHLORIDE 25 MG/1
25 TABLET ORAL 3 TIMES DAILY PRN
Qty: 20 TABLET | Refills: 0 | Status: SHIPPED | OUTPATIENT
Start: 2024-05-21 | End: 2024-05-23

## 2024-05-21 RX ORDER — KETOROLAC TROMETHAMINE 30 MG/ML
15 INJECTION, SOLUTION INTRAMUSCULAR; INTRAVENOUS ONCE
Status: COMPLETED | OUTPATIENT
Start: 2024-05-21 | End: 2024-05-21

## 2024-05-21 RX ORDER — ACETAMINOPHEN 325 MG/1
650 TABLET ORAL ONCE
Status: COMPLETED | OUTPATIENT
Start: 2024-05-21 | End: 2024-05-21

## 2024-05-21 RX ADMIN — DEXAMETHASONE SODIUM PHOSPHATE 4 MG: 4 INJECTION, SOLUTION INTRA-ARTICULAR; INTRALESIONAL; INTRAMUSCULAR; INTRAVENOUS; SOFT TISSUE at 17:45

## 2024-05-21 RX ADMIN — KETOROLAC TROMETHAMINE 15 MG: 30 INJECTION, SOLUTION INTRAMUSCULAR; INTRAVENOUS at 15:50

## 2024-05-21 RX ADMIN — DIPHENHYDRAMINE HYDROCHLORIDE 25 MG: 50 INJECTION INTRAMUSCULAR; INTRAVENOUS at 16:30

## 2024-05-21 RX ADMIN — MECLIZINE HYDROCHLORIDE 25 MG: 25 TABLET ORAL at 17:45

## 2024-05-21 RX ADMIN — SODIUM CHLORIDE 1000 ML: 9 INJECTION, SOLUTION INTRAVENOUS at 15:50

## 2024-05-21 RX ADMIN — ACETAMINOPHEN 650 MG: 325 TABLET ORAL at 16:30

## 2024-05-21 ASSESSMENT — PAIN DESCRIPTION - LOCATION: LOCATION: HEAD

## 2024-05-21 ASSESSMENT — COLUMBIA-SUICIDE SEVERITY RATING SCALE - C-SSRS
6. HAVE YOU EVER DONE ANYTHING, STARTED TO DO ANYTHING, OR PREPARED TO DO ANYTHING TO END YOUR LIFE?: NO
2. HAVE YOU ACTUALLY HAD ANY THOUGHTS OF KILLING YOURSELF?: NO
1. IN THE PAST MONTH, HAVE YOU WISHED YOU WERE DEAD OR WISHED YOU COULD GO TO SLEEP AND NOT WAKE UP?: NO

## 2024-05-21 ASSESSMENT — PAIN - FUNCTIONAL ASSESSMENT
PAIN_FUNCTIONAL_ASSESSMENT: 0-10
PAIN_FUNCTIONAL_ASSESSMENT: 0-10

## 2024-05-21 ASSESSMENT — PAIN SCALES - GENERAL
PAINLEVEL_OUTOF10: 8
PAINLEVEL_OUTOF10: 5 - MODERATE PAIN

## 2024-05-21 NOTE — ED TRIAGE NOTES
TRIAGE NOTE   I saw the patient as the Clinician in Triage and performed a brief history and physical exam, established acuity, and ordered appropriate tests to develop basic plan of care. Patient will be seen by an DIANE, resident and/or physician who will independently evaluate the patient. Please see subsequent provider notes for further details and disposition.     Brief HPI: In brief, Ellie Babcock is a 24 y.o. female that presents for lightheadedness and headache.  Patient states she has chronic headaches.  She was diagnosed with retinoblastoma as a child and underwent chemotherapy.  She has a prosthetic left eye.  She reports the headache and lightheadedness began 3 days ago.  No thunderclap onset.  No recent febrile illness.  No neck stiffness.  No rash.  She had a similar episode in March and was found to have strep throat.  Patient has been eating and drinking normally.  She attempted to take her typical migraine medication with little relief.  Because her headache has been constant over the past 3 days this prompted her to come to the ER today.  No nausea or vomiting.  Patient has no history of hypertension, her blood pressure is 127/77 ED.  Patient is not febrile or tachycardic.  Focused Physical exam:   Constitutional; patient is alert, cooperative, in no acute distress sitting in a brightly lit room  Eyes/ENT; patient has hearing aids in both ears.  Right pupil reactive to light.  Full extraocular range of motion without pain.  No temple pain.  No rash.  No meningeal signs.  Full range of motion of neck  Cardiac; regular rate and rhythm without murmurs rubs or gallops  Pulm: Clear to auscultation without wheezing rhonchi rales    Plan/MDM:   CT of the head without contrast and basic blood work is obtained.  Patient awaiting to be seen by ED attending physician and obtain ED room.    Please see subsequent provider note for further details and disposition

## 2024-05-21 NOTE — ED TRIAGE NOTES
Pt arrives via triage with complaint of dizziness and headache. Pt st onset of 5/19. Pt st the dizziness is making it difficult for her to walk. Pt denies any CP/SOB, denies any urinary sx. Arrives a/o x4, respirations regular and unlabored. Does not appear in distress.

## 2024-05-21 NOTE — DISCHARGE INSTRUCTIONS
Please take Tylenol and/or Motrin as needed for headache for the next 2 to 3 days.  Please follow-up with your primary care physician next 2 to 3 days repeat exam ensure improvement in symptoms or please use the meclizine to help with vertigo symptoms.  Please return to ED for worsening headache, difficulty moving arms legs, change in behavior or any other concerning symptoms.

## 2024-05-21 NOTE — ED TRIAGE NOTES
Pt also endorses hx of retinoblastoma as a child. St was born with it and received chemotherapy. St has been in remission ever since. Left eye is prosthetic from the cancer.

## 2024-05-21 NOTE — ED PROVIDER NOTES
HPI   Chief Complaint   Patient presents with    Dizziness    Headache       This is a 24-year-old woman with past medical history of migraines who is here with complaint of migraines.  Does usually get some vertigo type symptoms and develops worsening migraine.  Denies any thunderclap headache.  No focal neurofindings.  No syncope.  No chest or abdominal pain.  No other focal neurofindings.                      Liu Coma Scale Score: 15                     Patient History   Past Medical History:   Diagnosis Date    Acanthosis nigricans 02/25/2015    Papillomatosis, Gougerot-Carteaud confluent reticulate    Contact with and (suspected) exposure to other bacterial communicable diseases     Exposure to strep throat    Contact with and (suspected) exposure to pediculosis, acariasis and other infestations 07/01/2016    Scabies exposure    Other specified anxiety disorders 01/19/2017    Depression with anxiety    Otitis media, unspecified, right ear 12/10/2018    Acute right otitis media    Personal history of diseases of the skin and subcutaneous tissue 08/22/2015    History of pilonidal cyst    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    Personal history of other (healed) physical injury and trauma 08/13/2015    History of sprain of ankle    Personal history of other diseases of the respiratory system 04/16/2018    History of streptococcal pharyngitis    Personal history of other specified conditions 04/22/2015    History of abdominal pain    Personal history of other specified conditions 04/18/2016    History of epigastric pain    Pruritus, unspecified 06/17/2015    Ear itching    Right upper quadrant pain 11/20/2013    Abdominal pain, RUQ (right upper quadrant)    Secondary amenorrhea 06/17/2015    Amenorrhea, secondary    Unspecified injury of unspecified foot, initial encounter 08/06/2015    Foot injury    Unspecified injury of unspecified lower leg, initial encounter  06/27/2013    Knee injury    Unspecified sensorineural hearing loss     Hearing loss, sensorineural     Past Surgical History:   Procedure Laterality Date    OTHER SURGICAL HISTORY  10/09/2013    Globe Enucleation Left    OTHER SURGICAL HISTORY  11/19/2014    Patient Education - Child Adoption     No family history on file.  Social History     Tobacco Use    Smoking status: Never    Smokeless tobacco: Never   Vaping Use    Vaping status: Some Days   Substance Use Topics    Alcohol use: Yes     Comment: occassional    Drug use: Yes     Types: Marijuana       Physical Exam   ED Triage Vitals [05/21/24 1206]   Temperature Heart Rate Respirations BP   36.4 °C (97.5 °F) 80 18 127/77      Pulse Ox Temp src Heart Rate Source Patient Position   99 % -- -- --      BP Location FiO2 (%)     -- --       Physical Exam  Vitals and nursing note reviewed.   Constitutional:       Appearance: She is well-developed. She is obese.   HENT:      Head: Normocephalic and atraumatic.      Mouth/Throat:      Mouth: Mucous membranes are moist.      Pharynx: Oropharynx is clear.   Eyes:      Extraocular Movements: Extraocular movements intact.      Pupils: Pupils are equal, round, and reactive to light.   Cardiovascular:      Rate and Rhythm: Normal rate and regular rhythm.      Heart sounds: Normal heart sounds.   Pulmonary:      Effort: Pulmonary effort is normal.      Breath sounds: Normal breath sounds.   Abdominal:      General: Bowel sounds are normal.   Musculoskeletal:         General: Normal range of motion.      Cervical back: Normal range of motion and neck supple.   Skin:     General: Skin is warm and dry.      Capillary Refill: Capillary refill takes less than 2 seconds.   Neurological:      Mental Status: She is alert.      GCS: GCS eye subscore is 4. GCS verbal subscore is 5. GCS motor subscore is 6.   Psychiatric:         Mood and Affect: Mood normal.         Speech: Speech normal.         Behavior: Behavior normal.       ED  Course & MDM   Diagnoses as of 05/21/24 1710   Acute nonintractable headache, unspecified headache type       Medical Decision Making  Patient was sent for a CT scan of the head given atypical nature of her headache.  This was negative for acute pathology.  Patient was did initially have lab work sent that was for incorrect patient.  Following her negative CT scan she did receive treatment for her headache with Tylenol, Reglan, Toradol and Benadryl.  This did improve her headache.  Repeat lab work was sent.  Repeat lab work did show minimally elevated white blood cell count of 13.2.  No clinically significant anemia.  CRP negative at 0.64.  No acute kidney injury.  No metabolic anion gap acidosis.  Following treatment her headache is resolved.  Feeling improved.  Patient safely discharged home with plan to follow-up with outpatient provider.  Return precautions are discussed.    Amount and/or Complexity of Data Reviewed  Labs: ordered. Decision-making details documented in ED Course.  Radiology: ordered. Decision-making details documented in ED Course.  ECG/medicine tests:      Details: EKG interpreted by me shows normal sinus rhythm at a rate of 63 with no acute ischemic changes.  No STEMI.  No A-fib.        Procedure  Procedures     Tres Loaiza MD  05/25/24 0048

## 2024-05-22 LAB
ATRIAL RATE: 63 BPM
P AXIS: 47 DEGREES
P OFFSET: 195 MS
P ONSET: 138 MS
PR INTERVAL: 152 MS
Q ONSET: 214 MS
QRS COUNT: 10 BEATS
QRS DURATION: 92 MS
QT INTERVAL: 422 MS
QTC CALCULATION(BAZETT): 431 MS
QTC FREDERICIA: 429 MS
R AXIS: 33 DEGREES
T AXIS: 23 DEGREES
T OFFSET: 425 MS
VENTRICULAR RATE: 63 BPM

## 2024-05-23 RX ORDER — MECLIZINE HYDROCHLORIDE 25 MG/1
25 TABLET ORAL 3 TIMES DAILY PRN
Qty: 20 TABLET | Refills: 0 | Status: SHIPPED | OUTPATIENT
Start: 2024-05-23 | End: 2024-06-02

## 2024-08-02 ENCOUNTER — OFFICE VISIT (OUTPATIENT)
Dept: CARDIOLOGY | Facility: CLINIC | Age: 24
End: 2024-08-02
Payer: COMMERCIAL

## 2024-08-02 VITALS
OXYGEN SATURATION: 97 % | BODY MASS INDEX: 47.09 KG/M2 | HEART RATE: 88 BPM | WEIGHT: 293 LBS | SYSTOLIC BLOOD PRESSURE: 110 MMHG | HEIGHT: 66 IN | DIASTOLIC BLOOD PRESSURE: 72 MMHG

## 2024-08-02 DIAGNOSIS — R00.2 PALPITATIONS: Primary | ICD-10-CM

## 2024-08-02 PROCEDURE — 3008F BODY MASS INDEX DOCD: CPT | Performed by: INTERNAL MEDICINE

## 2024-08-02 PROCEDURE — 99203 OFFICE O/P NEW LOW 30 MIN: CPT | Performed by: INTERNAL MEDICINE

## 2024-08-02 PROCEDURE — 99213 OFFICE O/P EST LOW 20 MIN: CPT | Performed by: INTERNAL MEDICINE

## 2024-08-02 RX ORDER — TOPIRAMATE 50 MG/1
50 TABLET, FILM COATED ORAL 2 TIMES DAILY
COMMUNITY

## 2024-08-02 ASSESSMENT — PATIENT HEALTH QUESTIONNAIRE - PHQ9
2. FEELING DOWN, DEPRESSED OR HOPELESS: NOT AT ALL
SUM OF ALL RESPONSES TO PHQ9 QUESTIONS 1 AND 2: 0
1. LITTLE INTEREST OR PLEASURE IN DOING THINGS: NOT AT ALL

## 2024-08-02 ASSESSMENT — COLUMBIA-SUICIDE SEVERITY RATING SCALE - C-SSRS
2. HAVE YOU ACTUALLY HAD ANY THOUGHTS OF KILLING YOURSELF?: NO
6. HAVE YOU EVER DONE ANYTHING, STARTED TO DO ANYTHING, OR PREPARED TO DO ANYTHING TO END YOUR LIFE?: NO
1. IN THE PAST MONTH, HAVE YOU WISHED YOU WERE DEAD OR WISHED YOU COULD GO TO SLEEP AND NOT WAKE UP?: NO

## 2024-08-02 ASSESSMENT — PAIN SCALES - GENERAL: PAINLEVEL: 0-NO PAIN

## 2024-08-02 NOTE — PATIENT INSTRUCTIONS
Wear preventice monitor for 2 weeks.   Follow up in office with Dr Reynolds in 2 months.  Schedule Echocardiogram to be done at the same time with follow up appt in 2 months.

## 2024-08-03 NOTE — PROGRESS NOTES
Primary Care Physician: Iliana Mullen DO  Date of Visit: 08/02/2024 10:30 AM EDT  Location of [place] [facility] [place] [address]     Chief Complaint: No chief complaint on file.    HPI / Summary:   Ellie Babcock is a 24 y.o. 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  Surgical Hx:   She has a past surgical history that includes Other surgical history (10/09/2013) and Other surgical history (11/19/2014).   Social Hx:   She reports that she has never smoked. She quit smokeless tobacco use about 4 weeks ago. She reports current alcohol use. She reports that she does not currently use drugs.  Family Hx:   Her family history is not on file.   Allergies:  No Known Allergies  Outpatient Medications:  Current Outpatient Medications   Medication Instructions    ARIPiprazole (ABILIFY) 5 mg, oral, Daily RT    ergocalciferol (VITAMIN D-2) 1.25 mg, oral, Once Weekly     "metoclopramide (REGLAN) 10 mg, oral, Every 6 hours    rosuvastatin (CRESTOR) 5 mg, oral, Daily    topiramate (TOPAMAX) 50 mg, oral, 2 times daily    venlafaxine XR (EFFEXOR-XR) 75 mg, oral, Daily     Physical Exam:  Vitals:    08/02/24 1038 08/02/24 1114   BP: 119/83 110/72   BP Location: Left arm    Patient Position: Sitting    BP Cuff Size: Adult    Pulse: 91 88   SpO2: 97%    Weight: (!) 167 kg (367 lb 4.8 oz)    Height: 1.676 m (5' 6\")      Wt Readings from Last 5 Encounters:   08/02/24 (!) 167 kg (367 lb 4.8 oz)   02/29/24 (!) 152 kg (336 lb)   04/24/23 (!) 152 kg (336 lb)   12/02/22 (!) 145 kg (320 lb)   05/25/22 (!) 153 kg (337 lb 11.9 oz)     Physical Exam  JVP not elevated. Carotid impulses are 2+ without overlying bruit.   Chest exhibits fair to good air movement with completely clear breath sounds.   The cardiac rhythm is regular with no premature beats.   Normal S1 and S2. No gallop, murmur or rub, or click.   Abdomen is soft and benign without focal tenderness.   With no lower leg edema. The pedal pulses are intact.     Last Labs:  Admission on 05/21/2024, Discharged on 05/21/2024   Component Date Value    Ventricular Rate 05/21/2024 63     Atrial Rate 05/21/2024 63     CT Interval 05/21/2024 152     QRS Duration 05/21/2024 92     QT Interval 05/21/2024 422     QTC Calculation(Bazett) 05/21/2024 431     P Axis 05/21/2024 47     R Axis 05/21/2024 33     T Ingram 05/21/2024 23     QRS Count 05/21/2024 10     Q Onset 05/21/2024 214     P Onset 05/21/2024 138     P Offset 05/21/2024 195     T Offset 05/21/2024 425     QTC Fredericia 05/21/2024 429     WBC 05/21/2024      nRBC 05/21/2024      RBC 05/21/2024      Hemoglobin 05/21/2024      Hematocrit 05/21/2024      MCV 05/21/2024      MCH 05/21/2024      MCHC 05/21/2024      RDW 05/21/2024      Platelets 05/21/2024      Neutrophils % 05/21/2024      Immature Granulocytes %,* 05/21/2024      Lymphocytes % 05/21/2024      Monocytes % 05/21/2024      Eosinophils " % 05/21/2024      Basophils % 05/21/2024      Neutrophils Absolute 05/21/2024      Immature Granulocytes Ab* 05/21/2024      Lymphocytes Absolute 05/21/2024      Monocytes Absolute 05/21/2024      Eosinophils Absolute 05/21/2024      Basophils Absolute 05/21/2024      Glucose 05/21/2024      Sodium 05/21/2024      Potassium 05/21/2024      Chloride 05/21/2024      Bicarbonate 05/21/2024      Anion Gap 05/21/2024      Urea Nitrogen 05/21/2024      Creatinine 05/21/2024      eGFR 05/21/2024      Calcium 05/21/2024      Sedimentation Rate 05/21/2024      C-Reactive Protein 05/21/2024      C-Reactive Protein 05/21/2024 0.64     Sedimentation Rate 05/21/2024 75 (H)     WBC 05/21/2024 13.2 (H)     nRBC 05/21/2024 0.0     RBC 05/21/2024 4.37     Hemoglobin 05/21/2024 11.4 (L)     Hematocrit 05/21/2024 36.0     MCV 05/21/2024 82     MCH 05/21/2024 26.1     MCHC 05/21/2024 31.7 (L)     RDW 05/21/2024 20.9 (H)     Platelets 05/21/2024 353     Glucose 05/21/2024 84     Sodium 05/21/2024 137     Potassium 05/21/2024 5.0     Chloride 05/21/2024 104     Bicarbonate 05/21/2024 26     Anion Gap 05/21/2024 12     Urea Nitrogen 05/21/2024 7     Creatinine 05/21/2024 0.81     eGFR 05/21/2024 >90     Calcium 05/21/2024 8.8    Admission on 02/29/2024, Discharged on 02/29/2024   Component Date Value    Color, Urine 02/29/2024 Yellow     Appearance, Urine 02/29/2024 Turbid (N)     Specific Gravity, Urine 02/29/2024 1.029     pH, Urine 02/29/2024 6.0     Protein, Urine 02/29/2024 100 (2+) (A)     Glucose, Urine 02/29/2024 Normal     Blood, Urine 02/29/2024 NEGATIVE     Ketones, Urine 02/29/2024 40 (2+) (A)     Bilirubin, Urine 02/29/2024 NEGATIVE     Urobilinogen, Urine 02/29/2024 6 (2+) (A)     Nitrite, Urine 02/29/2024 NEGATIVE     Leukocyte Esterase, Urine 02/29/2024 NEGATIVE     Extra Tube 02/29/2024 Hold for add-ons.     WBC 02/29/2024 18.5 (H)     nRBC 02/29/2024 0.1 (H)     RBC 02/29/2024 4.34     Hemoglobin 02/29/2024 10.9 (L)      Hematocrit 02/29/2024 36.8     MCV 02/29/2024 85     MCH 02/29/2024 25.1 (L)     MCHC 02/29/2024 29.6 (L)     RDW 02/29/2024 22.5 (H)     Platelets 02/29/2024 285     Neutrophils % 02/29/2024 87.9     Immature Granulocytes %,* 02/29/2024 0.4     Lymphocytes % 02/29/2024 8.7     Monocytes % 02/29/2024 2.6     Eosinophils % 02/29/2024 0.2     Basophils % 02/29/2024 0.2     Neutrophils Absolute 02/29/2024 16.31 (H)     Immature Granulocytes Ab* 02/29/2024 0.08     Lymphocytes Absolute 02/29/2024 1.61     Monocytes Absolute 02/29/2024 0.48     Eosinophils Absolute 02/29/2024 0.03     Basophils Absolute 02/29/2024 0.03     Glucose 02/29/2024 119 (H)     Sodium 02/29/2024 138     Potassium 02/29/2024 4.6     Chloride 02/29/2024 105     Bicarbonate 02/29/2024 24     Urea Nitrogen 02/29/2024 8     Creatinine 02/29/2024 0.70     eGFR 02/29/2024 >90     Calcium 02/29/2024 8.6     Albumin 02/29/2024 3.7     Alkaline Phosphatase 02/29/2024 126 (H)     Total Protein 02/29/2024 7.8     AST 02/29/2024 23     Bilirubin, Total 02/29/2024 0.5     ALT 02/29/2024 15     Anion Gap 02/29/2024 9     HCG, Beta-Quantitative 02/29/2024 <1     Flu A Result 02/29/2024 Not Detected     Flu B Result 02/29/2024 Not Detected     Coronavirus 2019, PCR 02/29/2024 Detected (A)     RBC Morphology 02/29/2024 See Below     Target Cells 02/29/2024 Few     Ovalocytes 02/29/2024 Few     Maciej Cells 02/29/2024 Few     WBC, Urine 02/29/2024 1-5     RBC, Urine 02/29/2024 3-5     Squamous Epithelial Cell* 02/29/2024 10-25 (FEW)     Bacteria, Urine 02/29/2024 1+ (A)     Mucus, Urine 02/29/2024 1+         Assessment/Plan   1.  Palpitations.  The patient is a young adult black female who was past medical history is negative for hypertension borderline for prediabetes and a negative for hyperlipidemia.  Lipid panel 12/2/2022 includes a cholesterol 191  HDL 35 triglyceride 86.  Her history does include significant obesity along with a chronic anxiety  disorder.  She is followed at [facility].  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  Approximately 2 weeks ago while lying on the couch getting ready for bed she started having a sensation of heart fluttering that was rapid with some element of skipping.  She became very anxious and asked her mother to check her pulse who evidently confirmed that it was rapid possibly irregular.  The episode lasted 3 to 5 minutes.  She had a second episode that occurred 2 days later when she had set up to do some activity.  She describes fluttering that lasted 2 to 4 minutes.  Her baseline EKG 5/21/2024 shows sinus rhythm and is unremarkable.  She will have a 2-week external cardiac monitor applied return in 2 months to review results and to have an echocardiogram performed.        Orders:  Orders Placed This Encounter   Procedures    Holter Or Event Cardiac Monitor    Transthoracic echo (TTE) complete      Followup Appts:  Future Appointments   Date Time Provider Department Center   10/7/2024  3:30 PM MENT ECHO/STRESS [id number] [facility] [place]   10/7/2024  4:15 PM Jacob Reynolds MD [id number] [facility]           ____________________________________________________________  Jacob Reynolds MD  [facility]  Assistant Clinical Professor, [employer]  [facility]

## 2024-10-07 ENCOUNTER — APPOINTMENT (OUTPATIENT)
Dept: CARDIOLOGY | Facility: CLINIC | Age: 24
End: 2024-10-07
Payer: COMMERCIAL

## 2024-10-10 ENCOUNTER — APPOINTMENT (OUTPATIENT)
Dept: NEUROLOGY | Facility: CLINIC | Age: 24
End: 2024-10-10
Payer: COMMERCIAL

## 2024-10-28 ENCOUNTER — HOSPITAL ENCOUNTER (OUTPATIENT)
Dept: CARDIOLOGY | Facility: CLINIC | Age: 24
Discharge: HOME | End: 2024-10-28
Payer: COMMERCIAL

## 2024-10-28 DIAGNOSIS — R00.2 PALPITATIONS: ICD-10-CM

## 2024-10-28 PROCEDURE — 93248 EXT ECG>7D<15D REV&INTERPJ: CPT | Performed by: INTERNAL MEDICINE

## 2024-10-28 PROCEDURE — 93246 EXT ECG>7D<15D RECORDING: CPT

## 2025-01-13 PROBLEM — G43.709 CHRONIC MIGRAINE WITHOUT AURA WITHOUT STATUS MIGRAINOSUS, NOT INTRACTABLE: Status: ACTIVE | Noted: 2024-08-27

## 2025-01-13 PROBLEM — M54.6 THORACIC BACK PAIN: Status: ACTIVE | Noted: 2023-04-22

## 2025-01-13 PROBLEM — R63.5 ABNORMAL WEIGHT GAIN: Status: ACTIVE | Noted: 2025-01-13

## 2025-01-13 PROBLEM — H61.20 WAX IN EAR: Status: ACTIVE | Noted: 2025-01-13

## 2025-01-13 PROBLEM — M54.6 CHRONIC MIDLINE THORACIC BACK PAIN: Status: ACTIVE | Noted: 2023-04-22

## 2025-01-13 PROBLEM — M79.673 PAIN OF FOOT: Status: ACTIVE | Noted: 2025-01-13

## 2025-01-13 PROBLEM — E78.5 HYPERLIPIDEMIA: Status: ACTIVE | Noted: 2025-01-13

## 2025-01-13 PROBLEM — G89.29 CHRONIC PAIN IN RIGHT FOOT: Status: ACTIVE | Noted: 2023-06-16

## 2025-01-13 PROBLEM — E66.9 CHILDHOOD OBESITY: Status: ACTIVE | Noted: 2025-01-13

## 2025-01-13 PROBLEM — H53.9 TRANSIENT VISION DISTURBANCE: Status: ACTIVE | Noted: 2024-03-06

## 2025-01-13 PROBLEM — H61.20 IMPACTED CERUMEN: Status: ACTIVE | Noted: 2025-01-13

## 2025-01-13 PROBLEM — N64.89 PENDULOUS BREAST: Status: ACTIVE | Noted: 2023-04-22

## 2025-01-13 PROBLEM — Z97.0: Status: ACTIVE | Noted: 2025-01-13

## 2025-01-13 PROBLEM — Z85.840 HISTORY OF RETINOBLASTOMA: Status: ACTIVE | Noted: 2025-01-13

## 2025-01-13 PROBLEM — F43.10 POSTTRAUMATIC STRESS DISORDER: Chronic | Status: ACTIVE | Noted: 2021-05-25

## 2025-01-13 PROBLEM — S93.609A SPRAIN OF FOOT: Status: ACTIVE | Noted: 2025-01-13

## 2025-01-13 PROBLEM — F33.1 MODERATE EPISODE OF RECURRENT MAJOR DEPRESSIVE DISORDER: Chronic | Status: ACTIVE | Noted: 2022-02-04

## 2025-01-13 PROBLEM — M76.60 ACHILLES TENDINITIS: Status: ACTIVE | Noted: 2025-01-13

## 2025-01-13 PROBLEM — M25.559 HIP PAIN: Status: ACTIVE | Noted: 2025-01-13

## 2025-01-13 PROBLEM — G89.29 CHRONIC MIDLINE THORACIC BACK PAIN: Status: ACTIVE | Noted: 2023-04-22

## 2025-01-13 PROBLEM — M79.671 CHRONIC PAIN IN RIGHT FOOT: Status: ACTIVE | Noted: 2023-06-16

## 2025-01-13 PROBLEM — J34.89 NASAL CRUSTING: Status: ACTIVE | Noted: 2025-01-13

## 2025-01-31 ENCOUNTER — OFFICE VISIT (OUTPATIENT)
Dept: URGENT CARE | Age: 25
End: 2025-01-31
Payer: COMMERCIAL

## 2025-01-31 VITALS
OXYGEN SATURATION: 100 % | BODY MASS INDEX: 47.09 KG/M2 | HEART RATE: 76 BPM | WEIGHT: 293 LBS | TEMPERATURE: 97.6 F | DIASTOLIC BLOOD PRESSURE: 85 MMHG | SYSTOLIC BLOOD PRESSURE: 141 MMHG | HEIGHT: 66 IN

## 2025-01-31 DIAGNOSIS — B96.89 BACTERIAL SINUSITIS: ICD-10-CM

## 2025-01-31 DIAGNOSIS — B34.9 VIRAL SYNDROME: Primary | ICD-10-CM

## 2025-01-31 DIAGNOSIS — R05.1 ACUTE COUGH: ICD-10-CM

## 2025-01-31 DIAGNOSIS — J32.9 BACTERIAL SINUSITIS: ICD-10-CM

## 2025-01-31 LAB
POC RAPID INFLUENZA A: NEGATIVE
POC RAPID INFLUENZA B: NEGATIVE
POC SARS-COV-2 AG BINAX: NORMAL

## 2025-01-31 RX ORDER — AMOXICILLIN AND CLAVULANATE POTASSIUM 875; 125 MG/1; MG/1
875 TABLET, FILM COATED ORAL 2 TIMES DAILY
Qty: 10 TABLET | Refills: 0 | Status: SHIPPED | OUTPATIENT
Start: 2025-01-31 | End: 2025-02-05

## 2025-01-31 ASSESSMENT — ENCOUNTER SYMPTOMS
COUGH: 1
LIGHT-HEADEDNESS: 1

## 2025-01-31 NOTE — PROGRESS NOTES
"Subjective   Patient ID: Ellie Babcock \"Leonardo" is a 25 y.o. female. They present today with a chief complaint of Dizziness (For 1 day. ) and Cough (Chest congestion ).  Patient reports last time she felt that she had strep and COVID.  Patient reports lightheadedness worse with ambulation.  Currently denies lightheadedness/dizziness while sitting down.  Cough is nonproductive.  Denies chest pain, shortness of breath past Medical History  Allergies as of 01/31/2025    (No Known Allergies)       (Not in a hospital admission)       Past Medical History:   Diagnosis Date    Acanthosis nigricans 02/25/2015    Papillomatosis, Gougerot-Carteaud confluent reticulate    Contact with and (suspected) exposure to other bacterial communicable diseases     Exposure to strep throat    Contact with and (suspected) exposure to pediculosis, acariasis and other infestations 07/01/2016    Scabies exposure    Other specified anxiety disorders 01/19/2017    Depression with anxiety    Otitis media, unspecified, right ear 12/10/2018    Acute right otitis media    Personal history of diseases of the skin and subcutaneous tissue 08/22/2015    History of pilonidal cyst    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    Personal history of other (healed) physical injury and trauma 08/13/2015    History of sprain of ankle    Personal history of other diseases of the respiratory system 04/16/2018    History of streptococcal pharyngitis    Personal history of other specified conditions 04/22/2015    History of abdominal pain    Personal history of other specified conditions 04/18/2016    History of epigastric pain    Pruritus, unspecified 06/17/2015    Ear itching    Right upper quadrant pain 11/20/2013    Abdominal pain, RUQ (right upper quadrant)    Secondary amenorrhea 06/17/2015    Amenorrhea, secondary    Unspecified injury of unspecified foot, initial encounter 08/06/2015    Foot injury    Unspecified " "injury of unspecified lower leg, initial encounter 06/27/2013    Knee injury    Unspecified sensorineural hearing loss     Hearing loss, sensorineural       Past Surgical History:   Procedure Laterality Date    OTHER SURGICAL HISTORY  10/09/2013    Globe Enucleation Left    OTHER SURGICAL HISTORY  11/19/2014    Patient Education - Child Adoption        reports that she has never smoked. She quit smokeless tobacco use about 7 months ago. She reports current alcohol use. She reports that she does not currently use drugs.    Review of Systems  Review of Systems   HENT:  Positive for congestion.    Respiratory:  Positive for cough.    Neurological:  Positive for light-headedness.                                  Objective    Vitals:    01/31/25 1747   BP: 141/85   Pulse: 76   Temp: 36.4 °C (97.6 °F)   TempSrc: Temporal   SpO2: 100%   Weight: (!) 163 kg (360 lb)   Height: 1.676 m (5' 6\")     Patient's last menstrual period was 01/27/2025.    Physical Exam  Vitals and nursing note reviewed.   Constitutional:       Appearance: Normal appearance.   Eyes:      Conjunctiva/sclera: Conjunctivae normal.   Cardiovascular:      Rate and Rhythm: Normal rate.   Pulmonary:      Effort: Pulmonary effort is normal.   Neurological:      Mental Status: She is alert.   Psychiatric:         Mood and Affect: Mood normal.         Procedures    Point of Care Test & Imaging Results from this visit  No results found for this visit on 01/31/25.   No results found.    Diagnostic study results (if any) were reviewed by Bhupinder Martines PA-C.    Assessment/Plan   Allergies, medications, history, and pertinent labs/EKGs/Imaging reviewed by Bhupinder Martines PA-C.     Medical Decision Making  Covid and flu negative.  Vital signs stable.  No hypoxia or respiratory distress.   Findings consistent with sinusitis. No evidence of PNA, PTX, PTA on exam. No respiratory distress. Patent airway. Education on supportive measures and return precautions. "   augmentin    No motor or sensory deficits, no evidence of stroke.  Visual acuity intact.  Currently asymptomatic while sitting.    Orders and Diagnoses  Diagnoses and all orders for this visit:  Viral syndrome  -     POCT Covid-19 Rapid Antigen  -     POCT Influenza A/B manually resulted      Medical Admin Record      Patient disposition: Home    Electronically signed by Bhupinder Martines PA-C  6:05 PM

## 2025-02-15 ENCOUNTER — OFFICE VISIT (OUTPATIENT)
Dept: URGENT CARE | Age: 25
End: 2025-02-15
Payer: COMMERCIAL

## 2025-02-15 VITALS
SYSTOLIC BLOOD PRESSURE: 140 MMHG | DIASTOLIC BLOOD PRESSURE: 81 MMHG | HEART RATE: 89 BPM | BODY MASS INDEX: 48.82 KG/M2 | HEIGHT: 65 IN | OXYGEN SATURATION: 98 % | WEIGHT: 293 LBS | RESPIRATION RATE: 18 BRPM | TEMPERATURE: 97.9 F

## 2025-02-15 DIAGNOSIS — L73.2 HIDRADENITIS SUPPURATIVA: ICD-10-CM

## 2025-02-15 DIAGNOSIS — L02.214 ABSCESS OF GROIN, LEFT: Primary | ICD-10-CM

## 2025-02-15 RX ORDER — SULFAMETHOXAZOLE AND TRIMETHOPRIM 800; 160 MG/1; MG/1
1 TABLET ORAL 2 TIMES DAILY
Qty: 14 TABLET | Refills: 0 | Status: SHIPPED | OUTPATIENT
Start: 2025-02-15 | End: 2025-02-22

## 2025-02-15 ASSESSMENT — ENCOUNTER SYMPTOMS: WOUND: 1

## 2025-02-15 NOTE — PROGRESS NOTES
"Subjective   Patient ID: Ellie Babcock \"Tali\" is a 25 y.o. female. They present today with a chief complaint of Cyst (Boil groin area-4 days).    History of Present Illness  Pt presents with cyst, left groin region x 5 days. Hx of similar in the axilla and groin. Required I&D twice in past. Cyst started to drain the last 2 days, green in color without odor. Using heating pad and taking hot baths with some relief. No fever, chills.       History provided by:  Patient      Past Medical History  Allergies as of 02/15/2025    (No Known Allergies)       (Not in a hospital admission)       Past Medical History:   Diagnosis Date    Acanthosis nigricans 02/25/2015    Papillomatosis, Gougerot-Carteaud confluent reticulate    Contact with and (suspected) exposure to other bacterial communicable diseases     Exposure to strep throat    Contact with and (suspected) exposure to pediculosis, acariasis and other infestations 07/01/2016    Scabies exposure    Other specified anxiety disorders 01/19/2017    Depression with anxiety    Otitis media, unspecified, right ear 12/10/2018    Acute right otitis media    Personal history of diseases of the skin and subcutaneous tissue 08/22/2015    History of pilonidal cyst    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    Personal history of other (healed) physical injury and trauma 08/13/2015    History of sprain of ankle    Personal history of other diseases of the respiratory system 04/16/2018    History of streptococcal pharyngitis    Personal history of other specified conditions 04/22/2015    History of abdominal pain    Personal history of other specified conditions 04/18/2016    History of epigastric pain    Pruritus, unspecified 06/17/2015    Ear itching    Right upper quadrant pain 11/20/2013    Abdominal pain, RUQ (right upper quadrant)    Secondary amenorrhea 06/17/2015    Amenorrhea, secondary    Unspecified injury of unspecified foot, " "initial encounter 08/06/2015    Foot injury    Unspecified injury of unspecified lower leg, initial encounter 06/27/2013    Knee injury    Unspecified sensorineural hearing loss     Hearing loss, sensorineural       Past Surgical History:   Procedure Laterality Date    OTHER SURGICAL HISTORY  10/09/2013    Globe Enucleation Left    OTHER SURGICAL HISTORY  11/19/2014    Patient Education - Child Adoption        reports that she has never smoked. She quit smokeless tobacco use about 7 months ago. She reports current alcohol use. She reports that she does not currently use drugs.    Review of Systems  Review of Systems   Skin:  Positive for wound.   All other systems reviewed and are negative.                                 Objective    Vitals:    02/15/25 1312   BP: 140/81   BP Location: Right arm   Patient Position: Sitting   BP Cuff Size: Adult   Pulse: 89   Resp: 18   Temp: 36.6 °C (97.9 °F)   TempSrc: Oral   SpO2: 98%   Weight: (!) 163 kg (360 lb)   Height: 1.651 m (5' 5\")     Patient's last menstrual period was 01/27/2025.    Physical Exam  Vitals and nursing note reviewed.   Constitutional:       General: She is not in acute distress.     Appearance: Normal appearance. She is not ill-appearing, toxic-appearing or diaphoretic.   Cardiovascular:      Rate and Rhythm: Normal rate and regular rhythm.      Pulses: Normal pulses.      Heart sounds: No murmur heard.  Pulmonary:      Effort: Pulmonary effort is normal. No respiratory distress.      Breath sounds: No wheezing, rhonchi or rales.   Skin:            Comments: There is a small, 2 cm superficial abscess to the left groin. +induration without fluctuance. There is no active drainage or able to express drainage from the area. +ttp. No significant edema, streaking or warmth.    Neurological:      Mental Status: She is alert.         Procedures    Point of Care Test & Imaging Results from this visit  No results found for this visit on 02/15/25.   No results " found.    Diagnostic study results (if any) were reviewed by Angi Queen PA-C.    Assessment/Plan   Allergies, medications, history, and pertinent labs/EKGs/Imaging reviewed by Angi Queen PA-C.     Medical Decision Making  Area is indurated without fluctuance therefore I&D was not performed. Advised warm compresses 2-3 times daily. Rx bactrim. Return, go to the ED for worsening new or non improving symptoms. Provided Hibiclens to wash axilla and groin 1-2 times weekly. Likely underlying HS, referral to dermatology place.d     Orders and Diagnoses  Diagnoses and all orders for this visit:  Abscess of groin, left  -     sulfamethoxazole-trimethoprim (Bactrim DS) 800-160 mg tablet; Take 1 tablet by mouth 2 times a day for 7 days.  Hidradenitis suppurativa  -     Referral to Dermatology      Medical Admin Record      Patient disposition: Home    Electronically signed by Angi Queen PA-C  2:02 PM

## 2025-02-15 NOTE — PATIENT INSTRUCTIONS
I advise washing the arm pit and groin area 1-2 times per week with hibiclens soap provided to you today  Warm compresses to affected area 2-3 times daily for 10-15 minutes   Monitor for signs of infection: fever, chills, worsening redness, swelling, pain, or drainage and see your primary provider or return promptly if these develop.

## 2025-05-06 ENCOUNTER — OFFICE VISIT (OUTPATIENT)
Dept: URGENT CARE | Age: 25
End: 2025-05-06
Payer: COMMERCIAL

## 2025-05-06 VITALS
OXYGEN SATURATION: 100 % | TEMPERATURE: 97.8 F | RESPIRATION RATE: 18 BRPM | SYSTOLIC BLOOD PRESSURE: 109 MMHG | HEART RATE: 83 BPM | HEIGHT: 66 IN | DIASTOLIC BLOOD PRESSURE: 62 MMHG | WEIGHT: 293 LBS | BODY MASS INDEX: 47.09 KG/M2

## 2025-05-06 DIAGNOSIS — L73.2 HIDRADENITIS SUPPURATIVA: ICD-10-CM

## 2025-05-06 DIAGNOSIS — L02.416 ABSCESS OF LEFT THIGH: Primary | ICD-10-CM

## 2025-05-06 PROCEDURE — 1036F TOBACCO NON-USER: CPT | Performed by: PHYSICIAN ASSISTANT

## 2025-05-06 PROCEDURE — 3008F BODY MASS INDEX DOCD: CPT | Performed by: PHYSICIAN ASSISTANT

## 2025-05-06 PROCEDURE — 10060 I&D ABSCESS SIMPLE/SINGLE: CPT | Performed by: PHYSICIAN ASSISTANT

## 2025-05-06 PROCEDURE — G8433 SCR FOR DEP NOT CPT DOC RSN: HCPCS | Performed by: PHYSICIAN ASSISTANT

## 2025-05-06 PROCEDURE — 99213 OFFICE O/P EST LOW 20 MIN: CPT | Performed by: PHYSICIAN ASSISTANT

## 2025-05-06 RX ORDER — SULFAMETHOXAZOLE AND TRIMETHOPRIM 800; 160 MG/1; MG/1
1 TABLET ORAL 2 TIMES DAILY
Qty: 20 TABLET | Refills: 0 | Status: SHIPPED | OUTPATIENT
Start: 2025-05-06 | End: 2025-05-16

## 2025-05-06 ASSESSMENT — ENCOUNTER SYMPTOMS
COLOR CHANGE: 1
DIAPHORESIS: 0
SHORTNESS OF BREATH: 0
LOSS OF SENSATION IN FEET: 0
FEVER: 0
OCCASIONAL FEELINGS OF UNSTEADINESS: 0
CHILLS: 0
DEPRESSION: 0

## 2025-05-06 NOTE — PATIENT INSTRUCTIONS
Apply warm compresses as much as possible.    Keep clean.    Finish all antibiotics, bactrim    Follow up with dermatology, referral placed, contact information provided.

## 2025-05-06 NOTE — PROGRESS NOTES
"Subjective   Patient ID: Ellie Babcock \"Tali\" is a 25 y.o. female. They present today with a chief complaint of Other (Hidradenitis suppurative on left side of thigh ).    History of Present Illness  Abscess left thigh noticed Sunday night, not draining    Hx of same, last time Feb, rx bactrim bid x 7 days, resolved.     Denies fever, chills, sweats, nausea, vomiting.                Past Medical History  Allergies as of 05/06/2025    (No Known Allergies)       Prescriptions Prior to Admission[1]     Medical History[2]    Surgical History[3]     reports that she has never smoked. She quit smokeless tobacco use about 10 months ago. She reports current alcohol use. She reports that she does not currently use drugs.    Review of Systems  Review of Systems   Constitutional:  Negative for chills, diaphoresis and fever.   Respiratory:  Negative for shortness of breath.    Cardiovascular:  Negative for chest pain.   Skin:  Positive for color change.   All other systems reviewed and are negative.                                 Objective    Vitals:    05/06/25 1001   BP: 109/62   BP Location: Left arm   Patient Position: Sitting   BP Cuff Size: Adult   Pulse: 83   Resp: 18   Temp: 36.6 °C (97.8 °F)   TempSrc: Oral   SpO2: 100%   Weight: (!) 158 kg (348 lb)   Height: 1.676 m (5' 6\")     No LMP recorded.    Physical Exam  Constitutional:       General: She is not in acute distress.  Cardiovascular:      Rate and Rhythm: Normal rate.   Pulmonary:      Effort: Pulmonary effort is normal.   Skin:     Comments: Abscess, approx. 5 x 2.5 cm, non fluctuant on torso superior to inner lateral left thigh.  Dark skin, no erythema or ecchymosis. Visualized.    Neurological:      Mental Status: She is alert.         Incision and Drainage    Date/Time: 5/6/2025 11:24 AM    Performed by: Anahi Mckoy PA-C  Authorized by: Anahi Mckoy PA-C    Consent:     Consent obtained:  Verbal    Consent given by:  Patient    Risks, benefits, and " alternatives were discussed: yes      Risks discussed:  Bleeding, incomplete drainage, pain and infection    Alternatives discussed:  No treatment and referral  Universal protocol:     Procedure explained and questions answered to patient or proxy's satisfaction: yes      Immediately prior to procedure, a time out was called: yes      Patient identity confirmed:  Verbally with patient  Location:     Type:  Abscess    Location:  Trunk    Trunk location: superior to inner left thigh.  Pre-procedure details:     Skin preparation:  Povidone-iodine  Anesthesia:     Anesthesia method:  Local infiltration    Local anesthetic:  Lidocaine 1% WITH epi  Procedure type:     Complexity:  Simple  Procedure details:     Incision types:  Single straight    Drainage:  Purulent and bloody    Drainage amount:  Copious    Wound treatment:  Wound left open    Packing materials:  None  Post-procedure details:     Procedure completion:  Tolerated      Point of Care Test & Imaging Results from this visit  No results found for this visit on 05/06/25.   Imaging  No results found.    Cardiology, Vascular, and Other Imaging  No other imaging results found for the past 2 days      Diagnostic study results (if any) were reviewed by Anahi Mckoy PA-C.    Assessment/Plan   Allergies, medications, history, and pertinent labs/EKGs/Imaging reviewed by Anahi Mckoy PA-C.       Orders and Diagnoses  There are no diagnoses linked to this encounter.    Medical Admin Record      Patient disposition: Home    Electronically signed by Anahi Mckoy PA-C  10:06 AM           [1] (Not in a hospital admission)  [2]   Past Medical History:  Diagnosis Date    Acanthosis nigricans 02/25/2015    Papillomatosis, Gougerot-Carteaud confluent reticulate    Contact with and (suspected) exposure to other bacterial communicable diseases     Exposure to strep throat    Contact with and (suspected) exposure to pediculosis, acariasis and other infestations 07/01/2016     Scabies exposure    Other specified anxiety disorders 01/19/2017    Depression with anxiety    Otitis media, unspecified, right ear 12/10/2018    Acute right otitis media    Personal history of diseases of the skin and subcutaneous tissue 08/22/2015    History of pilonidal cyst    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    Personal history of other (healed) physical injury and trauma 08/13/2015    History of sprain of ankle    Personal history of other diseases of the respiratory system 04/16/2018    History of streptococcal pharyngitis    Personal history of other specified conditions 04/22/2015    History of abdominal pain    Personal history of other specified conditions 04/18/2016    History of epigastric pain    Pruritus, unspecified 06/17/2015    Ear itching    Right upper quadrant pain 11/20/2013    Abdominal pain, RUQ (right upper quadrant)    Secondary amenorrhea 06/17/2015    Amenorrhea, secondary    Unspecified injury of unspecified foot, initial encounter 08/06/2015    Foot injury    Unspecified injury of unspecified lower leg, initial encounter 06/27/2013    Knee injury    Unspecified sensorineural hearing loss     Hearing loss, sensorineural   [3]   Past Surgical History:  Procedure Laterality Date    OTHER SURGICAL HISTORY  10/09/2013    Globe Enucleation Left    OTHER SURGICAL HISTORY  11/19/2014    Patient Education - Child Adoption

## 2025-05-31 ENCOUNTER — OFFICE VISIT (OUTPATIENT)
Dept: URGENT CARE | Age: 25
End: 2025-05-31
Payer: COMMERCIAL

## 2025-05-31 VITALS
HEIGHT: 66 IN | TEMPERATURE: 98.6 F | BODY MASS INDEX: 47.09 KG/M2 | DIASTOLIC BLOOD PRESSURE: 72 MMHG | HEART RATE: 86 BPM | WEIGHT: 293 LBS | RESPIRATION RATE: 18 BRPM | SYSTOLIC BLOOD PRESSURE: 115 MMHG | OXYGEN SATURATION: 98 %

## 2025-05-31 DIAGNOSIS — K04.7 DENTAL INFECTION: Primary | ICD-10-CM

## 2025-05-31 PROCEDURE — 99213 OFFICE O/P EST LOW 20 MIN: CPT | Performed by: EMERGENCY MEDICINE

## 2025-05-31 PROCEDURE — 1036F TOBACCO NON-USER: CPT | Performed by: EMERGENCY MEDICINE

## 2025-05-31 PROCEDURE — 3008F BODY MASS INDEX DOCD: CPT | Performed by: EMERGENCY MEDICINE

## 2025-05-31 RX ORDER — CHLORHEXIDINE GLUCONATE ORAL RINSE 1.2 MG/ML
15 SOLUTION DENTAL AS NEEDED
Qty: 120 ML | Refills: 0 | Status: SHIPPED | OUTPATIENT
Start: 2025-05-31 | End: 2025-06-10

## 2025-05-31 RX ORDER — AMOXICILLIN AND CLAVULANATE POTASSIUM 875; 125 MG/1; MG/1
875 TABLET, FILM COATED ORAL 2 TIMES DAILY
Qty: 20 TABLET | Refills: 0 | Status: SHIPPED | OUTPATIENT
Start: 2025-05-31

## 2025-05-31 ASSESSMENT — ENCOUNTER SYMPTOMS
FATIGUE: 0
TROUBLE SWALLOWING: 0
COUGH: 0
ABDOMINAL PAIN: 0
CHILLS: 0
SHORTNESS OF BREATH: 0
HEADACHES: 0
NECK SWELLING: 0
FEVER: 0

## 2025-05-31 NOTE — PROGRESS NOTES
"23-year-old Subjective   Patient ID: Ellie Babcock \"Tali\" is a 25 y.o. female. They present today with a chief complaint of Dental Pain.    History of Present Illness  Patient is a 25-year-old female complaining of left upper jaw/dental pain x 2 weeks. Patient states she has been seen by her dentist and started on antibiotics.  She has 1 more day of antibiotics and pain is returning.  Patient denies fever, chills, facial swelling.      History provided by:  Patient   used: No    Dental Pain  Location:  Upper  Upper teeth location:  3/RU 1st molar and 2/RU 2nd molar  Severity:  Moderate  Duration:  2 weeks  Progression:  Worsening  Chronicity:  New  Context: dental caries and filling fell out    Context: not abscess    Previous work-up:  Filled cavity  Associated symptoms: no congestion, no fever, no headaches and no neck swelling        Past Medical History  Allergies as of 05/31/2025    (No Known Allergies)       Prescriptions Prior to Admission[1]     Medical History[2]    Surgical History[3]     reports that she has never smoked. She quit smokeless tobacco use about 10 months ago. She reports current alcohol use. She reports that she does not currently use drugs.    Review of Systems  Review of Systems   Constitutional:  Negative for chills, fatigue and fever.   HENT:  Positive for dental problem. Negative for congestion, ear pain and trouble swallowing.    Respiratory:  Negative for cough and shortness of breath.    Cardiovascular:  Negative for chest pain.   Gastrointestinal:  Negative for abdominal pain.   Neurological:  Negative for headaches.                                  Objective    Vitals:    05/31/25 1023   BP: 115/72   Pulse: 86   Resp: 18   Temp: 37 °C (98.6 °F)   TempSrc: Oral   SpO2: 98%   Weight: (!) 151 kg (333 lb)   Height: 1.676 m (5' 6\")     No LMP recorded.    Physical Exam  Constitutional:       General: She is not in acute distress.     Appearance: She is not " ill-appearing or toxic-appearing.   HENT:      Head: Normocephalic and atraumatic.      Nose: Nose normal.      Mouth/Throat:      Mouth: Mucous membranes are moist.      Dentition: Dental tenderness present. No dental abscesses.      Pharynx: No oropharyngeal exudate or posterior oropharyngeal erythema.        Comments: [place] - dental tenderness  Pulmonary:      Effort: Pulmonary effort is normal.   Musculoskeletal:         General: No swelling or deformity.   Skin:     General: Skin is warm and dry.   Neurological:      Mental Status: She is alert and oriented to person, place, and time.         Procedures    Point of Care Test & Imaging Results from this visit  No results found for this visit on 05/31/25.   Imaging  No results found.    Cardiology, Vascular, and Other Imaging  No other imaging results found for the past 2 days      Diagnostic study results (if any) were reviewed by Yancy Mcpherson MD.    Assessment/Plan   Allergies, medications, history, and pertinent labs/EKGs/Imaging reviewed by Yancy Mcpherson MD.     Medical Decision Making  Patient was seen and examined.  Patient complaining of dental tenderness and pain.  Patient was given dental referral.  She was started on Augmentin.  Patient will be discharged.  Patient advised to go to emergency room for increasing pain or infection.    Orders and Diagnoses  Diagnoses and all orders for this visit:  Dental infection  -     amoxicillin-clavulanate (Augmentin) 875-125 mg tablet; Take 1 tablet (875 mg of amoxicillin) by mouth 2 times a day.  -     Referral to Dentistry; Future  -     chlorhexidine (Peridex) 0.12 % solution; Use 15 mL in the mouth or throat if needed for wound care for up to 10 days.      Medical Admin Record      Patient disposition: Home    Electronically signed by Yancy Mcpherson MD  11:11 AM           [1] (Not in a hospital admission)   [2]   Past Medical History:  Diagnosis Date    Acanthosis nigricans 02/25/2015     Papillomatosis, Gougerot-Carteaud confluent reticulate    Contact with and (suspected) exposure to other bacterial communicable diseases     Exposure to strep throat    Contact with and (suspected) exposure to pediculosis, acariasis and other infestations 07/01/2016    Scabies exposure    Other specified anxiety disorders 01/19/2017    Depression with anxiety    Otitis media, unspecified, right ear 12/10/2018    Acute right otitis media    Personal history of diseases of the skin and subcutaneous tissue 08/22/2015    History of pilonidal cyst    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    Personal history of other (healed) physical injury and trauma 08/13/2015    History of sprain of ankle    Personal history of other diseases of the respiratory system 04/16/2018    History of streptococcal pharyngitis    Personal history of other specified conditions 04/22/2015    History of abdominal pain    Personal history of other specified conditions 04/18/2016    History of epigastric pain    Pruritus, unspecified 06/17/2015    Ear itching    Right upper quadrant pain 11/20/2013    Abdominal pain, RUQ (right upper quadrant)    Secondary amenorrhea 06/17/2015    Amenorrhea, secondary    Unspecified injury of unspecified foot, initial encounter 08/06/2015    Foot injury    Unspecified injury of unspecified lower leg, initial encounter 06/27/2013    Knee injury    Unspecified sensorineural hearing loss     Hearing loss, sensorineural   [3]   Past Surgical History:  Procedure Laterality Date    OTHER SURGICAL HISTORY  10/09/2013    Globe Enucleation Left    OTHER SURGICAL HISTORY  11/19/2014    Patient Education - Child Adoption

## 2025-06-03 ENCOUNTER — HOSPITAL ENCOUNTER (EMERGENCY)
Facility: HOSPITAL | Age: 25
Discharge: HOME | End: 2025-06-03
Payer: COMMERCIAL

## 2025-06-03 VITALS
WEIGHT: 293 LBS | DIASTOLIC BLOOD PRESSURE: 79 MMHG | BODY MASS INDEX: 48.82 KG/M2 | TEMPERATURE: 97.5 F | HEIGHT: 65 IN | HEART RATE: 93 BPM | SYSTOLIC BLOOD PRESSURE: 144 MMHG | RESPIRATION RATE: 16 BRPM | OXYGEN SATURATION: 99 %

## 2025-06-03 DIAGNOSIS — K08.89 PAIN, DENTAL: Primary | ICD-10-CM

## 2025-06-03 PROCEDURE — 99284 EMERGENCY DEPT VISIT MOD MDM: CPT

## 2025-06-03 PROCEDURE — 2500000004 HC RX 250 GENERAL PHARMACY W/ HCPCS (ALT 636 FOR OP/ED): Performed by: NURSE PRACTITIONER

## 2025-06-03 PROCEDURE — 96372 THER/PROPH/DIAG INJ SC/IM: CPT | Performed by: NURSE PRACTITIONER

## 2025-06-03 RX ORDER — KETOROLAC TROMETHAMINE 30 MG/ML
30 INJECTION, SOLUTION INTRAMUSCULAR; INTRAVENOUS ONCE
Status: COMPLETED | OUTPATIENT
Start: 2025-06-03 | End: 2025-06-03

## 2025-06-03 RX ORDER — KETOROLAC TROMETHAMINE 10 MG/1
10 TABLET, FILM COATED ORAL EVERY 8 HOURS PRN
Qty: 10 TABLET | Refills: 0 | Status: SHIPPED | OUTPATIENT
Start: 2025-06-03

## 2025-06-03 RX ADMIN — KETOROLAC TROMETHAMINE 30 MG: 30 INJECTION, SOLUTION INTRAMUSCULAR at 18:03

## 2025-06-03 ASSESSMENT — PAIN SCALES - GENERAL: PAINLEVEL_OUTOF10: 7

## 2025-06-03 ASSESSMENT — PAIN - FUNCTIONAL ASSESSMENT: PAIN_FUNCTIONAL_ASSESSMENT: 0-10

## 2025-06-03 NOTE — ED PROVIDER NOTES
HPI   Chief Complaint   Patient presents with    Dental Problem       HPI    See my MDM    Patient History   Medical History[1]  Surgical History[2]  Family History[3]  Social History[4]    Physical Exam   ED Triage Vitals [06/03/25 1744]   Temperature Heart Rate Respirations BP   36.4 °C (97.5 °F) 93 16 144/79      Pulse Ox Temp Source Heart Rate Source Patient Position   99 % Temporal -- --      BP Location FiO2 (%)     -- --       Physical Exam    CONSTITUTIONAL: Vital signs reviewed as charted, well-developed and in no distress  Eyes: Extraocular muscles are intact. Pupils equal round and reactive to light. Conjunctiva are pink.    ENT: Mucous membranes are moist. Tongue in the midline. Pharynx was without erythema or exudates, uvula midline.  The left upper second molar is fractured, there is no edema at the gumline, no facial swelling noted.  It is tender to palpation.  There is no drainage present.  LUNGS: Breath sounds equal and clear to auscultation. Good air exchange, no wheezes rales or retractions, pulse oximetry is charted.  HEART: Regular rate and rhythm without murmur thrill or rub, strong tones, auscultation is normal.  Neuro: The patient is awake, alert and oriented ×3. Moving all 4 extremities and answering questions appropriately.   MUSCULOSKELETAL: The calves are nontender to palpation. Full gross active range of motion.   PSYCH: Awake alert oriented, normal mood and affect.  Skin:  Dry, normal color, warm to the touch, no rash present.      ED Course & MDM   Diagnoses as of 06/03/25 1803   Pain, dental                 No data recorded     Liu Coma Scale Score: 15 (06/03/25 1746 : Jenise Christine RN)                           Medical Decision Making  History obtained from: patient    Vital signs, nursing notes, current medications, past medical history, Surgical history, allergies, social history, family History were reviewed.         HPI:  Last week they told her she needed a root canal and  referred her to [facility] dental [facility] but they are booked until December.  They did start her antibiotics.  States she found a dentist about 2 hours away that was covered by insurance so she drove down there and they told her they had to get insurance approval before they can extract her tooth.  She did have going to the urgent care over the weekend and they started her on a different antibiotic.  States the pain continues and is excruciating.  She denies any difficulty swallowing speaking or breathing.  There is no facial swelling noted.  She is nontoxic well-appearing      10 point ROS was reviewed and negative except Noted above in HPI.  DDX: as listed above          MDM Summary/considerations:  Labs Reviewed - No data to display  No orders to display     Medications   ketorolac (Toradol) injection 30 mg (has no administration in time range)     New Prescriptions    KETOROLAC (TORADOL) 10 MG TABLET    Take 1 tablet (10 mg) by mouth every 8 hours if needed for moderate pain (4 - 6).     I estimate there is LOW risk for a ANAPHYLAXIS, DEEP SPACE INFECTION (e.g., BALAJI'S ANGINA OR RETROPHARYNGEAL ABSCESS), EPIGLOTTITIS, MENINGITIS, or AIRWAY COMPROMISE, thus I consider the discharge disposition reasonable. Also, there is no evidence for sepsis, or toxicity. We have discussed the diagnosis and risks, and we agree with discharging home to follow-up with their primary doctor. We also discussed returning to the Emergency Department immediately if new or worsening symptoms occur. We have discussed the symptoms which are mostconcerning (e.g., changing or worsening pain, trouble swallowing or breathing, neck stiffness or fever) that necessitate immediate return.    Patient currently on Augmentin which she will continue, will write for anti-inflammatories.  She will follow with PCP as needed.  Was given low-cost dental referrals.  Was discharged home stable condition    All of the patient's questions were answered to  the best of my ability.  Patient states understanding that they have been screened for an emergency today and we have not found any etiology of symptoms that requires emergent treatment or admission to the hospital at this point. They understand that they have not had definitive care day and require follow-up for treatment of their condition. They also state understanding that they may have an emergent condition that may potentially have not of detected at this visit and they must return to the emergency department if they develop any worsening of symptoms or new complaints.      I have evaluated this patient, my supervising physician was available for consultation.              Critical Care:  Not warranted at this time      This chart was completed using voice recognition transcription software. Please excuse any errors of transcription including grammatical, punctuation, syntax and spelling errors.  Please contact me with any questions regarding this chart.  Patient 25-year-old female presenting emergency room today complaining of left upper dental pain.  States the tooth is fractured and is causing her a lot of pain.  She was initially seen by [facility] dental    Procedure  Procedures       [1]   Past Medical History:  Diagnosis Date    Acanthosis nigricans 02/25/2015    Papillomatosis, Gougerot-Carteaud confluent reticulate    Contact with and (suspected) exposure to other bacterial communicable diseases     Exposure to strep throat    Contact with and (suspected) exposure to pediculosis, acariasis and other infestations 07/01/2016    Scabies exposure    Other specified anxiety disorders 01/19/2017    Depression with anxiety    Otitis media, unspecified, right ear 12/10/2018    Acute right otitis media    Personal history of diseases of the skin and subcutaneous tissue 08/22/2015    History of pilonidal cyst    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    Personal  history of other (healed) physical injury and trauma 08/13/2015    History of sprain of ankle    Personal history of other diseases of the respiratory system 04/16/2018    History of streptococcal pharyngitis    Personal history of other specified conditions 04/22/2015    History of abdominal pain    Personal history of other specified conditions 04/18/2016    History of epigastric pain    Pruritus, unspecified 06/17/2015    Ear itching    Right upper quadrant pain 11/20/2013    Abdominal pain, RUQ (right upper quadrant)    Secondary amenorrhea 06/17/2015    Amenorrhea, secondary    Unspecified injury of unspecified foot, initial encounter 08/06/2015    Foot injury    Unspecified injury of unspecified lower leg, initial encounter 06/27/2013    Knee injury    Unspecified sensorineural hearing loss     Hearing loss, sensorineural   [2]   Past Surgical History:  Procedure Laterality Date    OTHER SURGICAL HISTORY  10/09/2013    Globe Enucleation Left    OTHER SURGICAL HISTORY  11/19/2014    Patient Education - Child Adoption   [3] No family history on file.  [4]   Social History  Tobacco Use    Smoking status: Never    Smokeless tobacco: Former     Quit date: 07/2024   Vaping Use    Vaping status: Some Days   Substance Use Topics    Alcohol use: Yes     Comment: occassional    Drug use: Not Currently        LYN Burger-MUSTAPHA  06/03/25 4837

## 2025-06-03 NOTE — ED TRIAGE NOTES
Pt arrives to ED with c/o broken tooth on the L lower jaw and is concerned that it is now infected. Is having increased pain while eating and drinking and endorses nausea that started yesterday. Pt states she has been to multiple dentists and it is unknown when she is following up again

## 2025-06-03 NOTE — DISCHARGE INSTRUCTIONS
Please return to the Emergency Department immediately if new or worsening symptoms occur. Symptoms that are mostconcerning (e.g., changing or worsening pain, trouble swallowing or breathing, neck stiffness or fever) that necessitate immediate return.       These are low-cost dental care locations.    #1 [facility] phone #[phone]    #2 [facility] phone number [phone]    #3 [facility] dental hygiene clinic phone number [phone]    #4 [facility] phone number [phone]    #5 [facility] phone number [phone]    Thank you for allowing us to take care of you today. While you are home, you might receive a survey about your care in our hospital. Your nurse and myself, Addison Goss CNP, would love your honest feedback on your care. Your feedback and especially your positive comments help our hospital receive the support we need to continue to serve you and your family. Thank you again for trusting us with your care.

## 2025-06-04 ENCOUNTER — HOSPITAL ENCOUNTER (EMERGENCY)
Facility: HOSPITAL | Age: 25
Discharge: HOME | End: 2025-06-04
Payer: COMMERCIAL

## 2025-06-04 VITALS
RESPIRATION RATE: 16 BRPM | BODY MASS INDEX: 48.82 KG/M2 | DIASTOLIC BLOOD PRESSURE: 90 MMHG | HEART RATE: 77 BPM | TEMPERATURE: 97.5 F | SYSTOLIC BLOOD PRESSURE: 158 MMHG | HEIGHT: 65 IN | OXYGEN SATURATION: 99 % | WEIGHT: 293 LBS

## 2025-06-04 DIAGNOSIS — K08.89 PAIN, DENTAL: Primary | ICD-10-CM

## 2025-06-04 PROCEDURE — 96372 THER/PROPH/DIAG INJ SC/IM: CPT | Performed by: NURSE PRACTITIONER

## 2025-06-04 PROCEDURE — 2500000004 HC RX 250 GENERAL PHARMACY W/ HCPCS (ALT 636 FOR OP/ED): Mod: SE | Performed by: NURSE PRACTITIONER

## 2025-06-04 PROCEDURE — 99283 EMERGENCY DEPT VISIT LOW MDM: CPT | Performed by: NURSE PRACTITIONER

## 2025-06-04 PROCEDURE — 99284 EMERGENCY DEPT VISIT MOD MDM: CPT

## 2025-06-04 RX ORDER — KETOROLAC TROMETHAMINE 30 MG/ML
30 INJECTION, SOLUTION INTRAMUSCULAR; INTRAVENOUS ONCE
Status: COMPLETED | OUTPATIENT
Start: 2025-06-04 | End: 2025-06-04

## 2025-06-04 RX ADMIN — KETOROLAC TROMETHAMINE 30 MG: 30 INJECTION, SOLUTION INTRAMUSCULAR; INTRAVENOUS at 09:47

## 2025-06-04 ASSESSMENT — PAIN SCALES - GENERAL: PAINLEVEL_OUTOF10: 8

## 2025-06-04 ASSESSMENT — PAIN DESCRIPTION - ORIENTATION: ORIENTATION: LEFT;LOWER

## 2025-06-04 ASSESSMENT — PAIN - FUNCTIONAL ASSESSMENT: PAIN_FUNCTIONAL_ASSESSMENT: 0-10

## 2025-06-04 ASSESSMENT — PAIN DESCRIPTION - LOCATION: LOCATION: TEETH

## 2025-06-04 ASSESSMENT — PAIN DESCRIPTION - PAIN TYPE: TYPE: ACUTE PAIN

## 2025-06-04 NOTE — ED TRIAGE NOTES
Patient to ED with c/o broken tooth to L lower mouth. States it doesn't feels right. Pain to L jaw. Denies pmh.

## 2025-06-04 NOTE — ED PROVIDER NOTES
HPI   Chief Complaint   Patient presents with    Dental Pain       HPI    This is a 25 year old female who is here today with ongoing dental pain. She has been seen in the ED at [facility], she is already on antibiotics and Toradol PO and was instructed to follow up with [facility]. Denies fever, chills and no nausea and vomiting.     Patient History   Medical History[1]  Surgical History[2]  Family History[3]  Social History[4]    Physical Exam   ED Triage Vitals [06/04/25 0926]   Temperature Heart Rate Respirations BP   36.4 °C (97.5 °F) 77 16 158/90      Pulse Ox Temp Source Heart Rate Source Patient Position   99 % Temporal Monitor Sitting      BP Location FiO2 (%)     Left arm --       Physical Exam  Constitutional:       Appearance: Normal appearance.   HENT:      Mouth/Throat:      Mouth: Mucous membranes are moist.      Comments: I see no erythema and/or swelling or any other infectious process indication   Eyes:      Extraocular Movements: Extraocular movements intact.      Pupils: Pupils are equal, round, and reactive to light.   Cardiovascular:      Rate and Rhythm: Normal rate and regular rhythm.   Musculoskeletal:         General: Normal range of motion.      Cervical back: Normal range of motion and neck supple.   Skin:     General: Skin is warm and dry.   Neurological:      General: No focal deficit present.      Mental Status: She is alert and oriented to person, place, and time.   Psychiatric:         Mood and Affect: Mood normal.         Behavior: Behavior normal.           ED Course & MDM   Diagnoses as of 06/04/25 0941   Pain, dental                 No data recorded     Liu Coma Scale Score: 15 (06/04/25 0927 : Vishal Campos RN)                           Medical Decision Making  This is a 25 year old female who is here today with ongoing dental pain. She has been seen in the ED at [facility], she is already on antibiotics and Toradol PO and was instructed to follow up with [facility]  dental school. Denies fever, chills and no nausea and vomiting.   I did not see any concerning signs of systemic or local infection.  She was given 30 mg of Toradol IM. She was instructed to continue with her antibiotics and the dental resources were given to her and she was discharged.           Procedure  Procedures         [1]   Past Medical History:  Diagnosis Date    Acanthosis nigricans 02/25/2015    Papillomatosis, Gougerot-Carteaud confluent reticulate    Contact with and (suspected) exposure to other bacterial communicable diseases     Exposure to strep throat    Contact with and (suspected) exposure to pediculosis, acariasis and other infestations 07/01/2016    Scabies exposure    Other specified anxiety disorders 01/19/2017    Depression with anxiety    Otitis media, unspecified, right ear 12/10/2018    Acute right otitis media    Personal history of diseases of the skin and subcutaneous tissue 08/22/2015    History of pilonidal cyst    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    Personal history of other (healed) physical injury and trauma 08/13/2015    History of sprain of ankle    Personal history of other diseases of the respiratory system 04/16/2018    History of streptococcal pharyngitis    Personal history of other specified conditions 04/22/2015    History of abdominal pain    Personal history of other specified conditions 04/18/2016    History of epigastric pain    Pruritus, unspecified 06/17/2015    Ear itching    Right upper quadrant pain 11/20/2013    Abdominal pain, RUQ (right upper quadrant)    Secondary amenorrhea 06/17/2015    Amenorrhea, secondary    Unspecified injury of unspecified foot, initial encounter 08/06/2015    Foot injury    Unspecified injury of unspecified lower leg, initial encounter 06/27/2013    Knee injury    Unspecified sensorineural hearing loss     Hearing loss, sensorineural   [2]   Past Surgical History:  Procedure  Laterality Date    OTHER SURGICAL HISTORY  10/09/2013    Globe Enucleation Left    OTHER SURGICAL HISTORY  11/19/2014    Patient Education - Child Adoption   [3] No family history on file.  [4]   Social History  Tobacco Use    Smoking status: Never    Smokeless tobacco: Former     Quit date: 07/2024   Vaping Use    Vaping status: Some Days   Substance Use Topics    Alcohol use: Yes     Comment: occassional    Drug use: Not Currently        LYN Montiel-CNP, DNP  06/04/25 1004